# Patient Record
Sex: MALE | Race: WHITE | NOT HISPANIC OR LATINO | Employment: OTHER | URBAN - METROPOLITAN AREA
[De-identification: names, ages, dates, MRNs, and addresses within clinical notes are randomized per-mention and may not be internally consistent; named-entity substitution may affect disease eponyms.]

---

## 2023-07-31 ENCOUNTER — HOSPITAL ENCOUNTER (OUTPATIENT)
Dept: DATA CONVERSION | Facility: HOSPITAL | Age: 73
End: 2023-07-31
Attending: INTERNAL MEDICINE | Admitting: INTERNAL MEDICINE

## 2023-07-31 DIAGNOSIS — Z86.010 PERSONAL HISTORY OF COLONIC POLYPS: ICD-10-CM

## 2023-07-31 DIAGNOSIS — G47.33 OBSTRUCTIVE SLEEP APNEA (ADULT) (PEDIATRIC): ICD-10-CM

## 2023-07-31 DIAGNOSIS — Z88.2 ALLERGY STATUS TO SULFONAMIDES: ICD-10-CM

## 2023-07-31 DIAGNOSIS — J44.9 CHRONIC OBSTRUCTIVE PULMONARY DISEASE, UNSPECIFIED (MULTI): ICD-10-CM

## 2023-07-31 DIAGNOSIS — E78.5 HYPERLIPIDEMIA, UNSPECIFIED: ICD-10-CM

## 2023-07-31 DIAGNOSIS — I10 ESSENTIAL (PRIMARY) HYPERTENSION: ICD-10-CM

## 2023-07-31 DIAGNOSIS — F32.A DEPRESSION, UNSPECIFIED: ICD-10-CM

## 2023-07-31 DIAGNOSIS — D12.4 BENIGN NEOPLASM OF DESCENDING COLON: ICD-10-CM

## 2023-07-31 DIAGNOSIS — Z99.81 DEPENDENCE ON SUPPLEMENTAL OXYGEN: ICD-10-CM

## 2023-07-31 DIAGNOSIS — Z12.11 ENCOUNTER FOR SCREENING FOR MALIGNANT NEOPLASM OF COLON: ICD-10-CM

## 2023-07-31 DIAGNOSIS — Z88.0 ALLERGY STATUS TO PENICILLIN: ICD-10-CM

## 2023-07-31 DIAGNOSIS — K64.8 OTHER HEMORRHOIDS: ICD-10-CM

## 2023-07-31 DIAGNOSIS — K57.30 DIVERTICULOSIS OF LARGE INTESTINE WITHOUT PERFORATION OR ABSCESS WITHOUT BLEEDING: ICD-10-CM

## 2023-07-31 DIAGNOSIS — J96.11 CHRONIC RESPIRATORY FAILURE WITH HYPOXIA (MULTI): ICD-10-CM

## 2023-08-03 LAB
COMPLETE PATHOLOGY REPORT: NORMAL
CONVERTED CLINICAL DIAGNOSIS-HISTORY: NORMAL
CONVERTED FINAL DIAGNOSIS: NORMAL
CONVERTED FINAL REPORT PDF LINK TO COPY AND PASTE: NORMAL
CONVERTED GROSS DESCRIPTION: NORMAL

## 2023-09-29 VITALS
HEART RATE: 72 BPM | TEMPERATURE: 97.2 F | SYSTOLIC BLOOD PRESSURE: 123 MMHG | HEIGHT: 72 IN | RESPIRATION RATE: 18 BRPM | BODY MASS INDEX: 36.58 KG/M2 | DIASTOLIC BLOOD PRESSURE: 100 MMHG | WEIGHT: 270.06 LBS

## 2023-09-30 NOTE — H&P
History of Present Illness:   History Present Illness:  Reason for surgery: surveillance colonoscopy   HPI:      Troy Melendez is a 72 year old male with a history of HTN, COPD, SARAH, HLD, chronic hypoxic respiratory failure on supplemental O2, and depression who presents for surveillance colonoscopy.      He previously had a colonoscopy in 2018. Records from that time are not available,  but [facility] this was an adenomatous polyp and surveillance was recommended in 5 years. He uses 3L O2 on exertion  secondary to COPD. He is not having any GI symptoms. He denies abdominal pain, change in bowel habits, hematochezia, and unintended weight loss.           Past Medical History  COPD  BPH  HTN  HLD  SARAH on CPAP (admits he doesn't use CPAP often; sleeps with wedge under his back)  obesity   neuropathy   lumbar spondylitis   B12 deficiency     Social Hx:  tobacco: none  etoh: none  illicit drug use: none  NSAIDs: none    Family Hx:   No GI malignancy, IBD, or pancreatitis         Current Meds    Medication Name Instruction   Carvedilol TABS    Cholecal DF TABS    Effexor TABS Take 1 tablet twice daily   Finasteride 5 MG Oral Tablet    Mirtazapine 15 MG Oral Tablet    Potassium Chloride Emilie ER 20 MEQ Oral Tablet Extended Release    Protonix 40 MG Oral Tablet Delayed Release Take 1 tablet daily   Simvastatin 10 MG Oral Tablet    traMADol HCl - 50 MG Oral Tablet    Valsartan 80 MG Oral Tablet            Allergies:        Allergies:  ·  penicillin : Unknown  ·  Aleve : Unknown  ·  tamsulosin : Unknown  ·  terazosin : Unknown  ·  sulfa  drugs: Unknown    Home Medication Review:   Home Medications Reviewed: yes     Impression/Procedure:   ·  Impression and Planned Procedure: surveillance colonoscopy       ERAS (Enhanced Recovery After Surgery):  ·  ERAS Patient: no       Vital Signs:  Temperature C: 36.2 degrees C   Temperature F: 97.1 degrees F   Heart Rate: 72 beats per minute    Respiratory Rate: 18 breath per minute   Blood Pressure Systolic: 123 mm/Hg   Blood Pressure Diastolic: 100 mm/Hg     Physical Exam by System:    Constitutional: Resting in bed, NAD, obese   ENMT: HEENT: NC/AT, oropharynx clear   Respiratory/Thorax: CTAB   Cardiovascular: RRR no MRG   Gastrointestinal: Good bowel sounds throughout, soft,  NT/ND   Extremities: No edema   Neurological: A&Ox3, strength and sensation grossly  intact bilaterally   Psychological: appropriate mood and affect   Skin: No jaundice, spider angiomas, hendrickson erythema     Consent:   COVID-19 Consent:  ·  COVID-19 Risk Consent Surgeon has reviewed key risks related to the risk of nellie COVID-19 and if they contract COVID-19 what the risks are.       Electronic Signatures:  Rajesh Zazueta)  (Signed 31-Jul-2023 09:28)   Authored: History of Present Illness, Allergies, Home  Medication Review, Impression/Procedure, ERAS, Physical Exam, Consent, Note Completion      Last Updated: 31-Jul-2023 09:28 by Rajesh Zazueta)

## 2024-04-08 ENCOUNTER — TRANSCRIBE ORDERS (OUTPATIENT)
Dept: CARDIAC REHAB | Facility: HOSPITAL | Age: 74
End: 2024-04-08
Payer: OTHER GOVERNMENT

## 2024-04-08 DIAGNOSIS — J44.9 CHRONIC OBSTRUCTIVE PULMONARY DISEASE, UNSPECIFIED COPD TYPE (MULTI): ICD-10-CM

## 2024-04-15 ENCOUNTER — CLINICAL SUPPORT (OUTPATIENT)
Dept: CARDIAC REHAB | Facility: HOSPITAL | Age: 74
End: 2024-04-15
Payer: OTHER GOVERNMENT

## 2024-04-15 VITALS — HEIGHT: 71 IN | BODY MASS INDEX: 37.99 KG/M2 | WEIGHT: 271.4 LBS

## 2024-04-15 DIAGNOSIS — J44.9 CHRONIC OBSTRUCTIVE PULMONARY DISEASE, UNSPECIFIED COPD TYPE (MULTI): ICD-10-CM

## 2024-04-15 PROBLEM — M54.9 BACK PAIN: Status: ACTIVE | Noted: 2024-04-15

## 2024-04-15 RX ORDER — POTASSIUM CHLORIDE 750 MG/1
CAPSULE, EXTENDED RELEASE ORAL EVERY 12 HOURS
COMMUNITY

## 2024-04-15 RX ORDER — LANOLIN ALCOHOL/MO/W.PET/CERES
CREAM (GRAM) TOPICAL
COMMUNITY

## 2024-04-15 RX ORDER — FINASTERIDE 5 MG/1
TABLET, FILM COATED ORAL
COMMUNITY

## 2024-04-15 RX ORDER — LORATADINE 10 MG/1
TABLET ORAL
COMMUNITY

## 2024-04-15 RX ORDER — MOMETASONE FUROATE 50 UG/1
SPRAY, METERED NASAL EVERY 12 HOURS
COMMUNITY

## 2024-04-15 RX ORDER — FAMOTIDINE 20 MG/1
TABLET, FILM COATED ORAL
COMMUNITY
Start: 2021-08-19

## 2024-04-15 RX ORDER — FLUTICASONE FUROATE AND VILANTEROL TRIFENATATE 50; 25 UG/1; UG/1
POWDER RESPIRATORY (INHALATION)
COMMUNITY
End: 2024-04-23 | Stop reason: WASHOUT

## 2024-04-15 RX ORDER — CARVEDILOL 12.5 MG/1
18 TABLET ORAL 2 TIMES DAILY
COMMUNITY

## 2024-04-15 RX ORDER — MIRTAZAPINE 15 MG/1
TABLET, FILM COATED ORAL
COMMUNITY

## 2024-04-15 RX ORDER — SIMVASTATIN 10 MG/1
TABLET, FILM COATED ORAL
COMMUNITY

## 2024-04-15 RX ORDER — FUROSEMIDE 40 MG/1
TABLET ORAL
COMMUNITY

## 2024-04-15 RX ORDER — TRAMADOL HYDROCHLORIDE 50 MG/1
75 TABLET ORAL
COMMUNITY

## 2024-04-15 ASSESSMENT — DUKE ACTIVITY SCORE INDEX (DASI)
CAN YOU TAKE CARE OF YOURSELF (EAT, DRESS, BATHE, OR USE TOILET): YES
TOTAL_SCORE: 12.7
CAN YOU DO LIGHT WORK AROUND THE HOUSE LIKE DUSTING OR WASHING DISHES: YES
CAN YOU WALK A BLOCK OR TWO ON LEVEL GROUND: NO
DASI METS SCORE: 4.3
CAN YOU DO YARD WORK LIKE RAKING LEAVES, WEEDING OR PUSHING A MOWER: NO
CAN YOU RUN A SHORT DISTANCE: NO
CAN YOU WALK INDOORS, SUCH AS AROUND YOUR HOUSE: YES
CAN YOU PARTICIPATE IN STRENOUS SPORTS LIKE SWIMMING, SINGLES TENNIS, FOOTBALL, BASKETBALL, OR SKIING: NO
CAN YOU HAVE SEXUAL RELATIONS: NO
CAN YOU CLIMB A FLIGHT OF STAIRS OR WALK UP A HILL: YES
CAN YOU DO MODERATE WORK AROUND THE HOUSE LIKE VACUUMING, SWEEPING FLOORS OR CARRYING GROCERIES: NO
CAN YOU PARTICIPATE IN MODERATE RECREATIONAL ACTIVITIES LIKE GOLF, BOWLING, DANCING, DOUBLES TENNIS OR THROWING A BASEBALL OR FOOTBALL: NO
CAN YOU DO HEAVY WORK AROUND THE HOUSE LIKE SCRUBBING FLOORS OR LIFTING AND MOVING HEAVY FURNITURE: NO

## 2024-04-15 ASSESSMENT — ENCOUNTER SYMPTOMS
DEPRESSION: 0
LOSS OF SENSATION IN FEET: 1
OCCASIONAL FEELINGS OF UNSTEADINESS: 1

## 2024-04-15 ASSESSMENT — PATIENT HEALTH QUESTIONNAIRE - PHQ9
1. LITTLE INTEREST OR PLEASURE IN DOING THINGS: MORE THAN HALF THE DAYS
9. THOUGHTS THAT YOU WOULD BE BETTER OFF DEAD, OR OF HURTING YOURSELF: NOT AT ALL
4. FEELING TIRED OR HAVING LITTLE ENERGY: MORE THAN HALF THE DAYS
3. TROUBLE FALLING OR STAYING ASLEEP OR SLEEPING TOO MUCH: NEARLY EVERY DAY
SUM OF ALL RESPONSES TO PHQ9 QUESTIONS 1 & 2: 2
2. FEELING DOWN, DEPRESSED OR HOPELESS: NOT AT ALL
7. TROUBLE CONCENTRATING ON THINGS, SUCH AS READING THE NEWSPAPER OR WATCHING TELEVISION: NOT AT ALL
6. FEELING BAD ABOUT YOURSELF - OR THAT YOU ARE A FAILURE OR HAVE LET YOURSELF OR YOUR FAMILY DOWN: NOT AT ALL
SUM OF ALL RESPONSES TO PHQ QUESTIONS 1-9: 7
SUM OF ALL RESPONSES TO PHQ QUESTIONS 1-9: 7
8. MOVING OR SPEAKING SO SLOWLY THAT OTHER PEOPLE COULD HAVE NOTICED. OR THE OPPOSITE, BEING SO FIGETY OR RESTLESS THAT YOU HAVE BEEN MOVING AROUND A LOT MORE THAN USUAL: NOT AT ALL
5. POOR APPETITE OR OVEREATING: NOT AT ALL

## 2024-04-15 NOTE — PROGRESS NOTES
Pulmonary Rehabilitation   Initial Treatment Plan    Name: Troy Melendez  Medical Record Number: [id number]  YOB: 1950  Age: 73 y.o.    Today’s Date: 4/15/2024  Primary Care Physician: Madi Aceves MD  Referring Physician: Dr. Bautista  Program Location: [facility]  Primary Diagnosis:   1. Chronic obstructive pulmonary disease, unspecified COPD type (Multi)           Onset/Date of Diagnosis: 2009    Initial Assessment, not yet started program.    Falls Risk: High  Psychosocial Assessment  Pre PHQ-9: 7  Post PHQ-9:   Sent PH-Q 9 to MD if score > 20: No; score < 20    Stress Management  Pt reported/currently experiencing stress: No  Patient uses stress management skills: Yes   History of: anxiety and depression  Currently seeing a mental health provider: Yes, Then provider name: Dr. Shawna KING  Social Support: Yes, Whom:Family, Friends  Pre CAT score: 25  Post CAT score:   Learning Assessment:  Learning assessment/barriers: None  Preferred learning method: Auditory, Visual, and Reading handout  Barriers: None  Stages of Change:Action    Psychosocial Plan  Goal Status: Initial Assessment; goals not yet started  Psychosocial Goals: Decrease CAT score  Decrease PHQ-9 score.  Learn about living with chronic lung disease.  Learn to use stress management techniques.    Psychosocial Interventions/Education: Educate patient on how to live with chronic lung disease.  Question patient on new or existing stress/anxiety issues at least once every 30 days.    Oxygen Assessment  SpO2 at rest: 94 % on RA  SpO2 with exertion:     Oxygen Use  Supplemental O2 prescribed: Yes,   Liters at rest: 0 L  Liters with exertion: 2-4 L    Using O2 as prescribed: Yes  [facility]   Demonstrates appropriate knowledge of O2 use: No   Demonstrates appropriate knowledge of O2 safety: No   Home O2 System: Concentrator  Portable O2 System: Portable Oxygen Concentrator  Hypoxia managed: Yes   Home Pulse Oximeter: Yes     Pre  "MMRC: 3  Post MMRC:     Oxygen Plan  Goal Status: Initial Assessment; goals not yet started  Oxygen Goals: Decrease MMRC score  Learn and use diaphragmatic breathing as needed.  Learn and use pursed lip breathing as needed.  Titrate supplemental O2 as needed to keep SpO2 at 88% or greater.    History of SARAH?: Yes, uses cpap regularly    Oxygen Education/Interventions: Learning to use pursed lip breathing during exercise to help lessen dyspnea.  SpO2 checks with pulse oximeter at rest and with each modality.  Supplemental O2 as needed.  Titrate supplemental O2 as needed to keep SpO2 at 88% or greater.    Nutrition Assessment:  Current Dietary Guidelines:  None  Barriers to dietary change: no    Diabetes Assessment  History of Diabetes: No    Weight Management  Height: 180.3 cm (5' 11\")  Weight: 271.4 lbs  BMI: 37.8    Nutrition Plan  Goal Status: Initial Assessment; goals not yet started    Nutrition Goals: Learn about healthy eating habits.  Lose 5-15 lbs. while in program.    Nutrition Interventions/Education: Body weight checked weekly.  Discuss fat screener results and recommendations.    Exercise Assessment  Home Exercise: No  Mode: NA  Frequency: NA  Duration: NA    6 Minute Walk Assessment   6 MWT distance: Not done due to back pain with walking.  FiO2 used during test:     Exercise Prescription  Exercise Prescription based on: Duke Activity Status Index (DASI)  DASI Score: 12.7   MET Score: 4.3   Frequency:  2 days/week   Mode: Recumbent Cycle, Arm Ergometer, and SciFit   Duration: 20-40 total aerobic minutes   Intensity: RPE 11-13  Target HR:  Rest +30  MET Level: 2+  Patient wears supplemental O2: Yes;   O2 Flow Rate: 2 to 6 L/min  SpO2 Range: 87 to 100 %     Modality Workload METs Duration (minutes)   1 Pre-Exercise      2 Scifit Stepper 1.0   5 :00   3 Scifit Stepper 1.0   5 :00   4 Arm Ergometer UEE 0W   5 :00   5 Recumbent Bike #1   5 :00   6 Post-Exercise      Resistance Training: Yes   Home Exercise " Prescription given: To be given prior to discharge from program.    Exercise Plan  Goal Status: Initial Assessment; goals not yet started  Exercise Goals: By discharge:  Increase exercise MET level by 5-10% each week  Increase total exercise duration to 30-45 minutes  Obtain 150 minutes/week of moderate intensity aerobic exercise  Initiate strength training 2-3 days a week  Initiate flexibility training 2-3 days a week  Establish a home exercise program before discharge    Exercise Interventions/Education: SPO2 goal is to add or titrate supplemnetal O2 as necessary at rest/exercise via nasal cannula or mask to prevent <88% SPO2.  Target goal for duration: 30-45 minutes; Increase MET level 5-10% each week or as tolerated.    Physical exercise restrictions: Back pain with walking, will not do TM.    Other Core Components/Risk Factor Assessment:  Medication adherence  Current Medications:              Medication compliance: Yes   Uses pill box/organizer: Yes    Carries medication list: No    Uses Inhalers appropriately: Yes     Blood Pressure Management  History of Hypertension: Yes   Medication Changes: No   Resting BP:  There were no vitals taken for this visit.     Heart Failure Management  Hx of Heart Failure: No    Smoking/Tobacco Assessment  Social History     Tobacco Use   Smoking Status Former    Current packs/day: 0.00    Average packs/day: 1 pack/day for 42.0 years (42.0 ttl pk-yrs)    Types: Cigarettes    Start date: 1967    Quit date: 2009    Years since quitting: 15.2    Passive exposure: Past   Smokeless Tobacco Never     Other Core Component Plan  Goal Status: Initial Assessment; goals not yet started    Other Core Component Goals: Learn to manage bronchial hygiene  Resting BP < 160/90.  Use all medications properly and as prescribed.  Remain cigarette free.    Frequent Cough?: No  Educational Assessment:  Pulmonary Knowledge Test:   Intake: 12  Discharge:     Other Core Component  Interventions/Education: Ensure patient is compliant with all medications throughout program.  Explain bronchial hygiene techniques.  Pre/post resting BPs at each session.    Individual Patient Goals:  Be able to do more  Do more housework  Increase strength and endurance    Goal Status: Initial Assessment; goals not yet started    Staff Comments:    Rehab Staff Signature: Tony Mora, RRT

## 2024-04-23 ENCOUNTER — CLINICAL SUPPORT (OUTPATIENT)
Dept: CARDIAC REHAB | Facility: HOSPITAL | Age: 74
End: 2024-04-23
Payer: OTHER GOVERNMENT

## 2024-04-23 DIAGNOSIS — J44.9 CHRONIC OBSTRUCTIVE PULMONARY DISEASE, UNSPECIFIED COPD TYPE (MULTI): ICD-10-CM

## 2024-04-23 PROCEDURE — 94625 PHY/QHP OP PULM RHB W/O MNTR: CPT | Performed by: INTERNAL MEDICINE

## 2024-04-23 RX ORDER — GUAIFENESIN 400 MG/1
400 TABLET ORAL EVERY 4 HOURS PRN
COMMUNITY

## 2024-04-23 RX ORDER — TROSPIUM CHLORIDE 20 MG/1
20 TABLET, FILM COATED ORAL DAILY
COMMUNITY

## 2024-04-23 RX ORDER — BUDESONIDE, GLYCOPYRROLATE, AND FORMOTEROL FUMARATE 160; 9; 4.8 UG/1; UG/1; UG/1
2 AEROSOL, METERED RESPIRATORY (INHALATION) 2 TIMES DAILY
COMMUNITY

## 2024-04-23 RX ORDER — MAGNESIUM OXIDE 420 MG/1
420 TABLET ORAL DAILY
COMMUNITY

## 2024-04-23 RX ORDER — PANTOPRAZOLE SODIUM 20 MG/1
20 TABLET, DELAYED RELEASE ORAL 2 TIMES DAILY
COMMUNITY

## 2024-04-25 ENCOUNTER — CLINICAL SUPPORT (OUTPATIENT)
Dept: CARDIAC REHAB | Facility: HOSPITAL | Age: 74
End: 2024-04-25
Payer: OTHER GOVERNMENT

## 2024-04-25 DIAGNOSIS — J44.9 CHRONIC OBSTRUCTIVE PULMONARY DISEASE, UNSPECIFIED COPD TYPE (MULTI): ICD-10-CM

## 2024-04-25 PROCEDURE — 94625 PHY/QHP OP PULM RHB W/O MNTR: CPT | Performed by: INTERNAL MEDICINE

## 2024-04-30 ENCOUNTER — CLINICAL SUPPORT (OUTPATIENT)
Dept: CARDIAC REHAB | Facility: HOSPITAL | Age: 74
End: 2024-04-30
Payer: OTHER GOVERNMENT

## 2024-04-30 DIAGNOSIS — J44.9 CHRONIC OBSTRUCTIVE PULMONARY DISEASE, UNSPECIFIED COPD TYPE (MULTI): ICD-10-CM

## 2024-04-30 PROCEDURE — 94625 PHY/QHP OP PULM RHB W/O MNTR: CPT | Performed by: INTERNAL MEDICINE

## 2024-05-02 ENCOUNTER — CLINICAL SUPPORT (OUTPATIENT)
Dept: CARDIAC REHAB | Facility: HOSPITAL | Age: 74
End: 2024-05-02
Payer: OTHER GOVERNMENT

## 2024-05-02 DIAGNOSIS — J44.9 CHRONIC OBSTRUCTIVE PULMONARY DISEASE, UNSPECIFIED COPD TYPE (MULTI): ICD-10-CM

## 2024-05-02 PROCEDURE — 94625 PHY/QHP OP PULM RHB W/O MNTR: CPT | Performed by: INTERNAL MEDICINE

## 2024-05-07 ENCOUNTER — CLINICAL SUPPORT (OUTPATIENT)
Dept: CARDIAC REHAB | Facility: HOSPITAL | Age: 74
End: 2024-05-07
Payer: OTHER GOVERNMENT

## 2024-05-07 DIAGNOSIS — J44.9 CHRONIC OBSTRUCTIVE PULMONARY DISEASE, UNSPECIFIED COPD TYPE (MULTI): ICD-10-CM

## 2024-05-07 PROCEDURE — 94625 PHY/QHP OP PULM RHB W/O MNTR: CPT | Performed by: INTERNAL MEDICINE

## 2024-05-09 ENCOUNTER — CLINICAL SUPPORT (OUTPATIENT)
Dept: CARDIAC REHAB | Facility: HOSPITAL | Age: 74
End: 2024-05-09
Payer: OTHER GOVERNMENT

## 2024-05-09 DIAGNOSIS — J44.9 CHRONIC OBSTRUCTIVE PULMONARY DISEASE, UNSPECIFIED COPD TYPE (MULTI): ICD-10-CM

## 2024-05-09 PROCEDURE — 94625 PHY/QHP OP PULM RHB W/O MNTR: CPT | Performed by: INTERNAL MEDICINE

## 2024-05-14 ENCOUNTER — CLINICAL SUPPORT (OUTPATIENT)
Dept: CARDIAC REHAB | Facility: HOSPITAL | Age: 74
End: 2024-05-14
Payer: OTHER GOVERNMENT

## 2024-05-14 DIAGNOSIS — J44.9 CHRONIC OBSTRUCTIVE PULMONARY DISEASE, UNSPECIFIED COPD TYPE (MULTI): ICD-10-CM

## 2024-05-14 PROCEDURE — 94625 PHY/QHP OP PULM RHB W/O MNTR: CPT | Performed by: INTERNAL MEDICINE

## 2024-05-15 ENCOUNTER — DOCUMENTATION (OUTPATIENT)
Dept: CARDIAC REHAB | Facility: HOSPITAL | Age: 74
End: 2024-05-15
Payer: OTHER GOVERNMENT

## 2024-05-15 NOTE — PROGRESS NOTES
Pulmonary Rehabilitation   30 Day Reassessment    Name: Troy Melendez  Medical Record Number: [id number]  YOB: 1950  Age: 73 y.o.    Today’s Date: 5/15/2024  Primary Care Physician: Madi Aceves MD  Referring Physician: Dr. Bautista  Program Location: [facility]  Primary Diagnosis:   1. Chronic obstructive pulmonary disease, unspecified COPD type (Multi)        Onset/Date of Diagnosis: 2009    Session #: 7    Falls Risk: High  Psychosocial Assessment  Pre PHQ-9: 7  Post PHQ-9:   Sent PH-Q 9 to MD if score > 20: No; score < 20    Stress Management  Pt reported/currently experiencing stress: No  Patient uses stress management skills: Yes   History of: anxiety and depression  Currently seeing a mental health provider: Yes, Then provider name: Dr. Shawna KING  Social Support: Yes, Whom:Family, Friends  Pre CAT score: 25  Post CAT score:   Learning Assessment:  Learning assessment/barriers: None  Preferred learning method: Auditory, Visual, and Reading handout  Barriers: None  Stages of Change:Action    Psychosocial Plan  Goal Status: In Progress  Psychosocial Goals: Decrease CAT score  Decrease PHQ-9 score.  Learn about living with chronic lung disease.  Learn to use stress management techniques.    Psychosocial Interventions/Education: Educate patient on how to live with chronic lung disease.  Question patient on new or existing stress/anxiety issues at least once every 30 days.  30 Day Reassessment: Patient reports no new stress, anxiety or depression at this time.    Oxygen Assessment  SpO2 at rest: 98 % on 2L  SpO2 with exertion: 87% on 2L    Oxygen Use  Supplemental O2 prescribed: Yes,   Liters at rest: 0 L  Liters with exertion: 2-4 L    Using O2 as prescribed: Yes  [facility]   Demonstrates appropriate knowledge of O2 use: No   Demonstrates appropriate knowledge of O2 safety: No   Home O2 System: Concentrator  Portable O2 System: Portable Oxygen Concentrator  Hypoxia managed: Yes   Home  "Pulse Oximeter: Yes     Pre MMRC: 3  Post MMRC:     Oxygen Plan  Goal Status: In Progress  Oxygen Goals: Decrease MMRC score  Learn and use diaphragmatic breathing as needed.  Learn and use pursed lip breathing as needed.  Titrate supplemental O2 as needed to keep SpO2 at 88% or greater.    History of SARAH?: Yes, uses cpap regularly    Oxygen Education/Interventions: Learning to use pursed lip breathing during exercise to help lessen dyspnea.  SpO2 checks with pulse oximeter at rest and with each modality.  Supplemental O2 as needed.  Titrate supplemental O2 as needed to keep SpO2 at 88% or greater.  30 Day Reassessment: Pt educated on diaphragmatic and pursed lip breathing techniques: Yes  Continuing to reinforce proper breathing techniques.  Spo2 of 87 or > maintained on 2L with exercise.    Nutrition Assessment:  Current Dietary Guidelines:  None  Barriers to dietary change: no    Diabetes Assessment  History of Diabetes: No    Weight Management  Start of Rehab Weight: 271.4  Height: 180.3 cm (5' 11\")  Weight: 270.2 lbs  BMI: 37.7    Nutrition Plan  Goal Status: In Progress  Nutrition Goals: Learn about healthy eating habits.  Lose 5-15 lbs. while in program.    Nutrition Interventions/Education: Body weight checked weekly.  Discuss fat screener results and recommendations.  30 Day Reassessment: Continuing to re-inforce healthy eating habits for pt.  Fatscreener & AHA H/O on fats reviewed and given to patient: Yes  Weight loss of 1.2 lbs since start of rehab. Troy states his diet is improving.    Exercise Assessment  Home Exercise: No  Mode: NA  Frequency: NA  Duration: NA    6 Minute Walk Assessment   6 MWT distance: Not done due to back pain with walking.    Exercise Prescription  Exercise Prescription based on: Duke Activity Status Index (DASI)  DASI Score: 12.7  MET Score: 4.3   Frequency:  2 days/week   Mode: Recumbent Cycle, Arm Ergometer, and SciFit   Duration: 20-40 total aerobic minutes   Intensity: RPE " 11-13  Target HR:  Rest +30  MET Level: 2+  Patient wears supplemental O2: Yes;   O2 Flow Rate: 2 to 6 L/min  SpO2 Range: 87 to 100 %     Modality Workload METs Duration (minutes)   1 Pre-Exercise      2 Scifit Stepper 1.0  2.5 8 :00   3 Scifit Stepper 1.0  2.5 8 :00   4 Arm Ergometer UBE 1.0 2.1 8 :00   5 Arm Ergometer UBE 1.0  2.1 8 :00   6 Post-Exercise      Resistance Training: Yes   Home Exercise Prescription given: To be given prior to discharge from program.    Exercise Plan  Goal Status:   Exercise Goals: By discharge:  Increase exercise MET level by 5-10% each week  Increase total exercise duration to 30-45 minutes  Obtain 150 minutes/week of moderate intensity aerobic exercise  Initiate strength training 2-3 days a week  Initiate flexibility training 2-3 days a week  Establish a home exercise program before discharge    Exercise Interventions/Education: SPO2 goal is to add or titrate supplemnetal O2 as necessary at rest/exercise via nasal cannula or mask to prevent <88% SPO2.  Target goal for duration: 30-45 minutes; Increase MET level 5-10% each week or as tolerated.    Physical exercise restrictions: Back pain with walking, will not do TM. Unable to do RB.  30 Day Reassessment: Changes made based on HR and RPE/D responses to exercise.  Received education each week present.  Endurance increased to 32 minutes per session.  Education Classes Completed: Using an Inhaler, Breathing Techniques, How to Live with COPD, Anxiety/Stress and Depression.    Other Core Components/Risk Factor Assessment:  Medication adherence  Current Outpatient Medications on File Prior to Visit   Medication Sig Dispense Refill    budesonide-glycopyr-formoterol (Breztri Aerosphere) 160-9-4.8 mcg/actuation HFA aerosol inhaler Inhale 2 puffs 2 times a day.      carvedilol (Coreg) 12.5 mg tablet Take 18 mg by mouth twice a day.      cyanocobalamin (Vitamin B-12) 1,000 mcg tablet Orally      famotidine (Pepcid) 20 mg tablet Take by mouth  once daily.      finasteride (Proscar) 5 mg tablet Take by mouth.      furosemide (Lasix) 40 mg tablet Take by mouth.      guaiFENesin (Humibid 3) 400 mg tablet Take 1 tablet (400 mg) by mouth every 4 hours if needed for cough.      loratadine (Claritin) 10 mg tablet Take by mouth.      magnesium oxide (Mag-Ox) 420 mg tablet Take 1 tablet (420 mg) by mouth once daily.      mirtazapine (Remeron) 15 mg tablet Take by mouth.      mometasone (Nasonex) 50 mcg/actuation nasal spray every 12 hours.      pantoprazole (ProtoNix) 20 mg EC tablet Take 1 tablet (20 mg) by mouth twice a day.      potassium chloride ER (Micro-K) 10 mEq ER capsule every 12 hours.      simvastatin (Zocor) 10 mg tablet Take by mouth.      traMADol (Ultram) 50 mg tablet Take 1.5 tablets (75 mg) by mouth.      trospium (Sanctura) 20 mg tablet Take 1 tablet (20 mg) by mouth once daily.      valsartan 80 mg tablet 80 mg, hydroCHLOROthiazide 12.5 mg tablet 12.5 mg Take 40 mg by mouth once daily.      venlafaxine (Effoxor) 12.5 MG split tablet Take 2 half tablet (25 mg) by mouth 2 times a day.       No current facility-administered medications on file prior to visit.   Current Medications:              Medication compliance: Yes   Uses pill box/organizer: Yes    Carries medication list: No    Uses Inhalers appropriately: Yes     Blood Pressure Management  History of Hypertension: Yes   Medication Changes: No   Resting BP:  114/69    Heart Failure Management  Hx of Heart Failure: No    Smoking/Tobacco Assessment  Social History     Tobacco Use   Smoking Status Former    Current packs/day: 0.00    Average packs/day: 1 pack/day for 42.0 years (42.0 ttl pk-yrs)    Types: Cigarettes    Start date: 1967    Quit date: 2009    Years since quitting: 15.3    Passive exposure: Past   Smokeless Tobacco Never     Other Core Component Plan  Goal Status: In Progress  Other Core Component Goals: Learn to manage bronchial hygiene  Resting BP < 160/90.  Use all medications  properly and as prescribed.  Remain cigarette free.    Frequent Cough?: No  Educational Assessment:  Pulmonary Knowledge Test:   Intake: 12  Discharge:     Other Core Component Interventions/Education: Ensure patient is compliant with all medications throughout program.  Explain bronchial hygiene techniques.  Pre/post resting BPs at each session.  30 Day Reassessment: Reviewed effects of exercise on BP: Yes  Reviewed knowledge test: Yes  Remains cigarette free at this time.  BP remains within target goal, compliant with meds. Pt reports no issues at this time.    Individual Patient Goals:  Be able to do more  Do more housework  Increase strength and endurance    Goal Status: In Progress    Staff Comments:  30 Day Reassessment: Troy has done well in pulmonay rehab. He has attended 7 sessions and seems to be motivated to make progress. Currently exercising for 32 minutes and METs are in between 2.1 and 2.5. We will continue to increase duration and resistance as able.    Rehab Staff Signature: Tony Mora, RRT

## 2024-05-16 ENCOUNTER — CLINICAL SUPPORT (OUTPATIENT)
Dept: CARDIAC REHAB | Facility: HOSPITAL | Age: 74
End: 2024-05-16
Payer: OTHER GOVERNMENT

## 2024-05-16 DIAGNOSIS — J44.9 CHRONIC OBSTRUCTIVE PULMONARY DISEASE, UNSPECIFIED COPD TYPE (MULTI): ICD-10-CM

## 2024-05-16 PROCEDURE — 94625 PHY/QHP OP PULM RHB W/O MNTR: CPT | Performed by: INTERNAL MEDICINE

## 2024-05-21 ENCOUNTER — APPOINTMENT (OUTPATIENT)
Dept: CARDIAC REHAB | Facility: HOSPITAL | Age: 74
End: 2024-05-21
Payer: OTHER GOVERNMENT

## 2024-05-23 ENCOUNTER — APPOINTMENT (OUTPATIENT)
Dept: CARDIAC REHAB | Facility: HOSPITAL | Age: 74
End: 2024-05-23
Payer: OTHER GOVERNMENT

## 2024-05-28 ENCOUNTER — CLINICAL SUPPORT (OUTPATIENT)
Dept: CARDIAC REHAB | Facility: HOSPITAL | Age: 74
End: 2024-05-28
Payer: OTHER GOVERNMENT

## 2024-05-28 DIAGNOSIS — J44.9 CHRONIC OBSTRUCTIVE PULMONARY DISEASE, UNSPECIFIED COPD TYPE (MULTI): ICD-10-CM

## 2024-05-28 PROCEDURE — 94625 PHY/QHP OP PULM RHB W/O MNTR: CPT | Performed by: INTERNAL MEDICINE

## 2024-05-30 ENCOUNTER — CLINICAL SUPPORT (OUTPATIENT)
Dept: CARDIAC REHAB | Facility: HOSPITAL | Age: 74
End: 2024-05-30
Payer: OTHER GOVERNMENT

## 2024-05-30 DIAGNOSIS — J44.9 CHRONIC OBSTRUCTIVE PULMONARY DISEASE, UNSPECIFIED COPD TYPE (MULTI): ICD-10-CM

## 2024-05-30 PROCEDURE — 94625 PHY/QHP OP PULM RHB W/O MNTR: CPT | Performed by: INTERNAL MEDICINE

## 2024-06-04 ENCOUNTER — CLINICAL SUPPORT (OUTPATIENT)
Dept: CARDIAC REHAB | Facility: HOSPITAL | Age: 74
End: 2024-06-04
Payer: OTHER GOVERNMENT

## 2024-06-04 DIAGNOSIS — J44.9 CHRONIC OBSTRUCTIVE PULMONARY DISEASE, UNSPECIFIED COPD TYPE (MULTI): ICD-10-CM

## 2024-06-04 PROCEDURE — 94625 PHY/QHP OP PULM RHB W/O MNTR: CPT | Performed by: INTERNAL MEDICINE

## 2024-06-06 ENCOUNTER — CLINICAL SUPPORT (OUTPATIENT)
Dept: CARDIAC REHAB | Facility: HOSPITAL | Age: 74
End: 2024-06-06
Payer: OTHER GOVERNMENT

## 2024-06-06 DIAGNOSIS — J44.9 CHRONIC OBSTRUCTIVE PULMONARY DISEASE, UNSPECIFIED COPD TYPE (MULTI): ICD-10-CM

## 2024-06-06 PROCEDURE — 94625 PHY/QHP OP PULM RHB W/O MNTR: CPT | Performed by: INTERNAL MEDICINE

## 2024-06-11 ENCOUNTER — CLINICAL SUPPORT (OUTPATIENT)
Dept: CARDIAC REHAB | Facility: HOSPITAL | Age: 74
End: 2024-06-11
Payer: OTHER GOVERNMENT

## 2024-06-11 DIAGNOSIS — J44.9 CHRONIC OBSTRUCTIVE PULMONARY DISEASE, UNSPECIFIED COPD TYPE (MULTI): ICD-10-CM

## 2024-06-11 PROCEDURE — 94625 PHY/QHP OP PULM RHB W/O MNTR: CPT | Performed by: INTERNAL MEDICINE

## 2024-06-12 ENCOUNTER — DOCUMENTATION (OUTPATIENT)
Dept: CARDIAC REHAB | Facility: HOSPITAL | Age: 74
End: 2024-06-12
Payer: OTHER GOVERNMENT

## 2024-06-12 NOTE — PROGRESS NOTES
Pulmonary Rehabilitation   60 Day Reassessment    Name: Troy Melendez  Medical Record Number: [id number]  YOB: 1950  Age: 73 y.o.    Today’s Date: 6/12/2024  Primary Care Physician: Madi Aceves MD  Referring Physician: Dr. Bautista  Program Location: [facility]  Primary Diagnosis:   1. Chronic obstructive pulmonary disease, unspecified COPD type (Multi)        Onset/Date of Diagnosis: 2009    Session #: 13    Falls Risk: High  Psychosocial Assessment  Pre PHQ-9: 7  Post PHQ-9:   Sent PH-Q 9 to MD if score > 20: No; score < 20    Stress Management  Pt reported/currently experiencing stress: No  Patient uses stress management skills: Yes   History of: anxiety and depression  Currently seeing a mental health provider: Yes, Then provider name: Dr. Shawna KING  Social Support: Yes, Whom:Family, Friends  Pre CAT score: 25  Post CAT score:   Learning Assessment:  Learning assessment/barriers: None  Preferred learning method: Auditory, Visual, and Reading handout  Barriers: None  Stages of Change:Action    Psychosocial Plan  Goal Status: In Progress  Psychosocial Goals: Decrease CAT score  Decrease PHQ-9 score.  Learn about living with chronic lung disease.  Learn to use stress management techniques.    Psychosocial Interventions/Education: Educate patient on how to live with chronic lung disease.  Question patient on new or existing stress/anxiety issues at least once every 30 days.  60 Day Reassessment: Patient reports no new stress, anxiety or depression at this time.    Oxygen Assessment  SpO2 at rest: 97 % on 2L  SpO2 with exertion: 87% on 2L    Oxygen Use  Supplemental O2 prescribed: Yes,   Liters at rest: 0 L  Liters with exertion: 2-4 L    Using O2 as prescribed: Yes  [facility]   Demonstrates appropriate knowledge of O2 use: No   Demonstrates appropriate knowledge of O2 safety: No   Home O2 System: Concentrator  Portable O2 System: Portable Oxygen Concentrator  Hypoxia managed: Yes  "  Home Pulse Oximeter: Yes     Pre MMRC: 3  Post MMRC:     Oxygen Plan  Goal Status: In Progress  Oxygen Goals: Decrease MMRC score  Learn and use diaphragmatic breathing as needed.  Learn and use pursed lip breathing as needed.  Titrate supplemental O2 as needed to keep SpO2 at 88% or greater.    History of SARAH?: Yes, uses cpap regularly    Oxygen Education/Interventions: Learning to use pursed lip breathing during exercise to help lessen dyspnea.  SpO2 checks with pulse oximeter at rest and with each modality.  Supplemental O2 as needed.  Titrate supplemental O2 as needed to keep SpO2 at 88% or greater.  60 Day Reassessment: Pt educated on diaphragmatic and pursed lip breathing techniques: Yes  Continuing to reinforce proper breathing techniques.  Spo2 of 87 or > maintained on 2L with exercise.    Nutrition Assessment:  Current Dietary Guidelines:  None  Barriers to dietary change: no    Diabetes Assessment  History of Diabetes: No    Weight Management  Start of Rehab Weight: 271.4  Height: 180.3 cm (5' 11\")  Weight: 263.8 lbs  BMI: 36.8    Nutrition Plan  Goal Status: In Progress  Nutrition Goals: Learn about healthy eating habits.  Lose 5-15 lbs. while in program.    Nutrition Interventions/Education: Body weight checked weekly.  Discuss fat screener results and recommendations.  60 Day Reassessment: Continuing to re-inforce healthy eating habits for pt.  Fatscreener & AHA H/O on fats reviewed and given to patient: Yes  Weight loss of 7.6 lbs since start of rehab. Troy states his diet has been going well.    Exercise Assessment  Home Exercise: Occasional  Mode: Weights  Frequency: 2/wk  Duration: 15 mins    6 Minute Walk Assessment   6 MWT distance: Not done due to back pain with walking.    Exercise Prescription  Exercise Prescription based on: Duke Activity Status Index (DASI)  DASI Score: 12.7  MET Score: 4.3   Frequency:  2 days/week   Mode: Recumbent Cycle, Arm Ergometer, and SciFit   Duration: 20-40 " total aerobic minutes   Intensity: RPE 11-13  Target HR:  Rest +30  MET Level: 2+  Patient wears supplemental O2: Yes;   O2 Flow Rate: 2 to 6 L/min  SpO2 Range: 87 to 100 %     Modality Workload METs Duration (minutes)   1 Pre-Exercise      2 Scifit Stepper 1.2 2.7 10 :00   3 Hermilo Scifit 1.0  2.1 10 :00   4 Arm Ergometer UBE 1.2 2.1 10 :00   5 Arm Ergometer UBE 1.2 2.1 10 :00   6 Post-Exercise      Resistance Training: Yes   Home Exercise Prescription given: Yes  Previous instruction on free weights, pt states he does not need re-instruction.  Patient demonstrated proper technique and had no questions or concerns.  Home exercise program with stretching, balance exercises and weight exercises given.    Exercise Plan  Goal Status: In Progress  Exercise Goals: By discharge:  Increase exercise MET level by 5-10% each week  Increase total exercise duration to 30-45 minutes  Obtain 150 minutes/week of moderate intensity aerobic exercise  Initiate strength training 2-3 days a week  Initiate flexibility training 2-3 days a week  Establish a home exercise program before discharge    Exercise Interventions/Education: SPO2 goal is to add or titrate supplemnetal O2 as necessary at rest/exercise via nasal cannula or mask to prevent <88% SPO2.  Target goal for duration: 30-45 minutes; Increase MET level 5-10% each week or as tolerated.    Physical exercise restrictions: Back pain with walking, will not do TM. Unable to do RB.  60 Day Reassessment: Changes made based on HR and RPE/D responses to exercise.  Received education each week present.  Endurance increased to 40 minutes per session.  Education Classes Completed: Using an Inhaler, Breathing Techniques, How to Live with COPD, Anxiety/Stress and Depression, Nutrition, Smoking Cessation, Exercise.    Other Core Components/Risk Factor Assessment:  Medication adherence  Current Outpatient Medications on File Prior to Visit   Medication Sig Dispense Refill     budesonide-glycopyr-formoterol (Breztri Aerosphere) 160-9-4.8 mcg/actuation HFA aerosol inhaler Inhale 2 puffs 2 times a day.      carvedilol (Coreg) 12.5 mg tablet Take 18 mg by mouth twice a day.      cyanocobalamin (Vitamin B-12) 1,000 mcg tablet Orally      famotidine (Pepcid) 20 mg tablet Take by mouth once daily.      finasteride (Proscar) 5 mg tablet Take by mouth.      furosemide (Lasix) 40 mg tablet Take by mouth.      guaiFENesin (Humibid 3) 400 mg tablet Take 1 tablet (400 mg) by mouth every 4 hours if needed for cough.      loratadine (Claritin) 10 mg tablet Take by mouth.      magnesium oxide (Mag-Ox) 420 mg tablet Take 1 tablet (420 mg) by mouth once daily.      mirtazapine (Remeron) 15 mg tablet Take by mouth.      mometasone (Nasonex) 50 mcg/actuation nasal spray every 12 hours.      pantoprazole (ProtoNix) 20 mg EC tablet Take 1 tablet (20 mg) by mouth twice a day.      potassium chloride ER (Micro-K) 10 mEq ER capsule every 12 hours.      simvastatin (Zocor) 10 mg tablet Take by mouth.      traMADol (Ultram) 50 mg tablet Take 1.5 tablets (75 mg) by mouth.      trospium (Sanctura) 20 mg tablet Take 1 tablet (20 mg) by mouth once daily.      valsartan 80 mg tablet 80 mg, hydroCHLOROthiazide 12.5 mg tablet 12.5 mg Take 40 mg by mouth once daily.      venlafaxine (Effoxor) 12.5 MG split tablet Take 2 half tablet (25 mg) by mouth 2 times a day.       No current facility-administered medications on file prior to visit.               Medication compliance: Yes   Uses pill box/organizer: Yes    Carries medication list: No    Uses Inhalers appropriately: Yes     Blood Pressure Management  History of Hypertension: Yes   Medication Changes: No   Resting BP:  108/69    Heart Failure Management  Hx of Heart Failure: No    Smoking/Tobacco Assessment  Social History     Tobacco Use   Smoking Status Former    Current packs/day: 0.00    Average packs/day: 1 pack/day for 42.0 years (42.0 ttl pk-yrs)    Types:  Cigarettes    Start date: 1967    Quit date: 2009    Years since quitting: 15.4    Passive exposure: Past   Smokeless Tobacco Never     Other Core Component Plan  Goal Status: In Progress  Other Core Component Goals: Learn to manage bronchial hygiene  Resting BP < 160/90.  Use all medications properly and as prescribed.  Remain cigarette free.    Frequent Cough?: No  Educational Assessment:  Pulmonary Knowledge Test:   Intake: 12  Discharge:     Other Core Component Interventions/Education: Ensure patient is compliant with all medications throughout program.  Explain bronchial hygiene techniques.  Pre/post resting BPs at each session.  60 Day Reassessment: Reviewed effects of exercise on BP: Yes  Reviewed knowledge test: Yes  Remains cigarette free at this time.  BP remains within target goal, compliant with meds. Pt reports no issues at this time.    Individual Patient Goals:  Be able to do more  Do more housework  Increase strength and endurance    Goal Status: In Progress    Staff Comments:  60 Day Reassessment: Troy has completed 13 sessions of pulmonary rehab. Endurance increased to 40 minutes per session and METs have increased to a max of 2.7. We will continue to increase resistance as able. Troy has improved his diet and has lost 7.6 lbs since start of rehab.    Rehab Staff Signature: Toyn Mora, RRT

## 2024-06-13 ENCOUNTER — CLINICAL SUPPORT (OUTPATIENT)
Dept: CARDIAC REHAB | Facility: HOSPITAL | Age: 74
End: 2024-06-13
Payer: OTHER GOVERNMENT

## 2024-06-13 DIAGNOSIS — J44.9 CHRONIC OBSTRUCTIVE PULMONARY DISEASE, UNSPECIFIED COPD TYPE (MULTI): ICD-10-CM

## 2024-06-13 PROCEDURE — 94625 PHY/QHP OP PULM RHB W/O MNTR: CPT | Performed by: INTERNAL MEDICINE

## 2024-06-18 ENCOUNTER — APPOINTMENT (OUTPATIENT)
Dept: CARDIAC REHAB | Facility: HOSPITAL | Age: 74
End: 2024-06-18
Payer: OTHER GOVERNMENT

## 2024-06-20 ENCOUNTER — APPOINTMENT (OUTPATIENT)
Dept: CARDIAC REHAB | Facility: HOSPITAL | Age: 74
End: 2024-06-20
Payer: OTHER GOVERNMENT

## 2024-06-25 ENCOUNTER — CLINICAL SUPPORT (OUTPATIENT)
Dept: CARDIAC REHAB | Facility: HOSPITAL | Age: 74
End: 2024-06-25
Payer: OTHER GOVERNMENT

## 2024-06-25 DIAGNOSIS — J44.9 CHRONIC OBSTRUCTIVE PULMONARY DISEASE, UNSPECIFIED COPD TYPE (MULTI): ICD-10-CM

## 2024-06-25 PROCEDURE — 94625 PHY/QHP OP PULM RHB W/O MNTR: CPT | Performed by: INTERNAL MEDICINE

## 2024-06-27 ENCOUNTER — APPOINTMENT (OUTPATIENT)
Dept: CARDIAC REHAB | Facility: HOSPITAL | Age: 74
End: 2024-06-27
Payer: OTHER GOVERNMENT

## 2024-07-02 ENCOUNTER — CLINICAL SUPPORT (OUTPATIENT)
Dept: CARDIAC REHAB | Facility: HOSPITAL | Age: 74
End: 2024-07-02
Payer: OTHER GOVERNMENT

## 2024-07-02 DIAGNOSIS — J44.9 CHRONIC OBSTRUCTIVE PULMONARY DISEASE, UNSPECIFIED COPD TYPE (MULTI): ICD-10-CM

## 2024-07-02 PROCEDURE — 94625 PHY/QHP OP PULM RHB W/O MNTR: CPT | Performed by: INTERNAL MEDICINE

## 2024-07-03 ENCOUNTER — DOCUMENTATION (OUTPATIENT)
Dept: CARDIAC REHAB | Facility: HOSPITAL | Age: 74
End: 2024-07-03
Payer: OTHER GOVERNMENT

## 2024-07-03 NOTE — PROGRESS NOTES
Pulmonary Rehabilitation   90 Day Reassessment    Name: Troy Melendez  Medical Record Number: [id number]  YOB: 1950  Age: 73 y.o.    Today’s Date: 7/3/2024  Primary Care Physician: Madi Aceves MD  Referring Physician: Dr. Bautista  Program Location: [facility]  Primary Diagnosis:   1. Chronic obstructive pulmonary disease, unspecified COPD type (Multi)        Onset/Date of Diagnosis: 2009    Session #: 16    Falls Risk: High  Psychosocial Assessment  Pre PHQ-9: 7  Post PHQ-9:   Sent PH-Q 9 to MD if score > 20: No; score < 20    Stress Management  Pt reported/currently experiencing stress: No  Patient uses stress management skills: Yes   History of: anxiety and depression  Currently seeing a mental health provider: Yes, Then provider name: Dr. Shawna KING  Social Support: Yes, Whom:Family, Friends  Pre CAT score: 25  Post CAT score:   Learning Assessment:  Learning assessment/barriers: None  Preferred learning method: Auditory, Visual, and Reading handout  Barriers: None  Stages of Change:Action    Psychosocial Plan  Goal Status: In Progress  Psychosocial Goals: Decrease CAT score  Decrease PHQ-9 score.  Learn about living with chronic lung disease.  Learn to use stress management techniques.    Psychosocial Interventions/Education: Educate patient on how to live with chronic lung disease.  Question patient on new or existing stress/anxiety issues at least once every 30 days.  90 Day Reassessment: Patient reports no new stress, anxiety or depression at this time.    Oxygen Assessment  SpO2 at rest: 97 % on 2L  SpO2 with exertion: 87% on 2L    Oxygen Use  Supplemental O2 prescribed: Yes,   Liters at rest: 0 L  Liters with exertion: 2-4 L    Using O2 as prescribed: Yes  [facility]   Demonstrates appropriate knowledge of O2 use: No   Demonstrates appropriate knowledge of O2 safety: No   Home O2 System: Concentrator  Portable O2 System: Portable Oxygen Concentrator  Hypoxia managed: Yes   Home  "Pulse Oximeter: Yes     Pre MMRC: 3  Post MMRC:     Oxygen Plan  Goal Status: In Progress  Oxygen Goals: Decrease MMRC score  Learn and use diaphragmatic breathing as needed.  Learn and use pursed lip breathing as needed.  Titrate supplemental O2 as needed to keep SpO2 at 88% or greater.    History of SARAH?: Yes, uses cpap regularly    Oxygen Education/Interventions: Learning to use pursed lip breathing during exercise to help lessen dyspnea.  SpO2 checks with pulse oximeter at rest and with each modality.  Supplemental O2 as needed.  Titrate supplemental O2 as needed to keep SpO2 at 88% or greater.  90 Day Reassessment: Pt educated on diaphragmatic and pursed lip breathing techniques: Yes  Continuing to reinforce proper breathing techniques.  Spo2 of 87 or > maintained on 2L with exercise.    Nutrition Assessment:  Current Dietary Guidelines:  None  Barriers to dietary change: no    Diabetes Assessment  History of Diabetes: No    Weight Management  Start of Rehab Weight: 271.4  Height: 180.3 cm (5' 11\")  Weight: 267.2 lbs  BMI: 37.3    Nutrition Plan  Goal Status: In Progress  Nutrition Goals: Learn about healthy eating habits.  Lose 5-15 lbs. while in program.    Nutrition Interventions/Education: Body weight checked weekly.  Discuss fat screener results and recommendations.  90 Day Reassessment: Continuing to re-inforce healthy eating habits for pt.  Fatscreener & AHA H/O on fats reviewed and given to patient: Yes  Weight loss of 74.2 lbs since start of rehab. Troy states his diet has been \"okay\", will try to keep improving.    Exercise Assessment  Home Exercise: Occasional  Mode: Weights  Frequency: 2/wk  Duration: 15 mins    6 Minute Walk Assessment   6 MWT distance: Not done due to back pain with walking.    Exercise Prescription  Exercise Prescription based on: Duke Activity Status Index (DASI)  DASI Score: 12.7  MET Score: 4.3   Frequency:  2 days/week   Mode: Recumbent Cycle, Arm Ergometer, and " SciFit   Duration: 20-40 total aerobic minutes   Intensity: RPE 11-13  Target HR:  Rest +30  MET Level: 2+  Patient wears supplemental O2: Yes;   O2 Flow Rate: 2 to 6 L/min  SpO2 Range: 87 to 100 %     Modality Workload METs Duration (minutes)   1 Pre-Exercise      2 Scifit Stepper 1.2 2.7 10 :00   3 Hermilo Scifit 1.0  2.1 10 :00   4 Arm Ergometer UBE 1.2 2.3 10 :00   5 Arm Ergometer UBE 1.2 2.3 10 :00   6 Post-Exercise      Resistance Training: Yes   Home Exercise Prescription given: Yes  Previous instruction on free weights, pt states he does not need re-instruction.  Patient demonstrated proper technique and had no questions or concerns.  Home exercise program with stretching, balance exercises and weight exercises given.    Exercise Plan  Goal Status: In Progress  Exercise Goals: By discharge:  Increase exercise MET level by 5-10% each week  Increase total exercise duration to 30-45 minutes  Obtain 150 minutes/week of moderate intensity aerobic exercise  Initiate strength training 2-3 days a week  Initiate flexibility training 2-3 days a week  Establish a home exercise program before discharge    Exercise Interventions/Education: SPO2 goal is to add or titrate supplemnetal O2 as necessary at rest/exercise via nasal cannula or mask to prevent <88% SPO2.  Target goal for duration: 30-45 minutes; Increase MET level 5-10% each week or as tolerated.    Physical exercise restrictions: Back pain with walking, will not do TM. Unable to do RB.  90 Day Reassessment: Changes made based on HR and RPE/D responses to exercise.  Received education each week present.  Endurance of 40 minutes per session maintained.  Education Classes Completed: Using an Inhaler, Breathing Techniques, How to Live with COPD, Anxiety/Stress and Depression, Nutrition, Smoking Cessation, Exercise, Diabetes, Risk Factors.    Other Core Components/Risk Factor Assessment:  Medication adherence  Current Outpatient Medications on File Prior to Visit    Medication Sig Dispense Refill    budesonide-glycopyr-formoterol (Breztri Aerosphere) 160-9-4.8 mcg/actuation HFA aerosol inhaler Inhale 2 puffs 2 times a day.      carvedilol (Coreg) 12.5 mg tablet Take 18 mg by mouth twice a day.      cyanocobalamin (Vitamin B-12) 1,000 mcg tablet Orally      famotidine (Pepcid) 20 mg tablet Take by mouth once daily.      finasteride (Proscar) 5 mg tablet Take by mouth.      furosemide (Lasix) 40 mg tablet Take by mouth.      guaiFENesin (Humibid 3) 400 mg tablet Take 1 tablet (400 mg) by mouth every 4 hours if needed for cough.      loratadine (Claritin) 10 mg tablet Take by mouth.      magnesium oxide (Mag-Ox) 420 mg tablet Take 1 tablet (420 mg) by mouth once daily.      mirtazapine (Remeron) 15 mg tablet Take by mouth.      mometasone (Nasonex) 50 mcg/actuation nasal spray every 12 hours.      pantoprazole (ProtoNix) 20 mg EC tablet Take 1 tablet (20 mg) by mouth twice a day.      potassium chloride ER (Micro-K) 10 mEq ER capsule every 12 hours.      simvastatin (Zocor) 10 mg tablet Take by mouth.      traMADol (Ultram) 50 mg tablet Take 1.5 tablets (75 mg) by mouth.      trospium (Sanctura) 20 mg tablet Take 1 tablet (20 mg) by mouth once daily.      valsartan 80 mg tablet 80 mg, hydroCHLOROthiazide 12.5 mg tablet 12.5 mg Take 40 mg by mouth once daily.      venlafaxine (Effoxor) 12.5 MG split tablet Take 2 half tablet (25 mg) by mouth 2 times a day.       No current facility-administered medications on file prior to visit.               Medication compliance: Yes   Uses pill box/organizer: Yes    Carries medication list: No    Uses Inhalers appropriately: Yes     Blood Pressure Management  History of Hypertension: Yes   Medication Changes: No   Resting BP:  112/68    Heart Failure Management  Hx of Heart Failure: No    Smoking/Tobacco Assessment  Social History     Tobacco Use   Smoking Status Former    Current packs/day: 0.00    Average packs/day: 1 pack/day for 42.0  years (42.0 ttl pk-yrs)    Types: Cigarettes    Start date: 1967    Quit date: 2009    Years since quitting: 15.5    Passive exposure: Past   Smokeless Tobacco Never     Other Core Component Plan  Goal Status: In Progress  Other Core Component Goals: Learn to manage bronchial hygiene  Resting BP < 160/90.  Use all medications properly and as prescribed.  Remain cigarette free.    Frequent Cough?: No  Educational Assessment:  Pulmonary Knowledge Test:   Intake: 12  Discharge:     Other Core Component Interventions/Education: Ensure patient is compliant with all medications throughout program.  Explain bronchial hygiene techniques.  Pre/post resting BPs at each session.  90 Day Reassessment: Reviewed effects of exercise on BP: Yes  Reviewed knowledge test: Yes  Remains cigarette free at this time.  BP remains within target goal, compliant with meds. Pt reports no issues at this time.    Individual Patient Goals:  Be able to do more  Do more housework  Increase strength and endurance    Goal Status: In Progress    Staff Comments:  90 Day Reassessment: Troy has completed 16 sessions of pulmonary rehab. Endurance of 40 minutes per session maintained and METs have remained consistent at a max of 2.7. We will continue to increase resistance as able.     Rehab Staff Signature: Tony Mora, RRT

## 2024-07-09 ENCOUNTER — CLINICAL SUPPORT (OUTPATIENT)
Dept: CARDIAC REHAB | Facility: HOSPITAL | Age: 74
End: 2024-07-09
Payer: OTHER GOVERNMENT

## 2024-07-09 DIAGNOSIS — J44.9 CHRONIC OBSTRUCTIVE PULMONARY DISEASE, UNSPECIFIED COPD TYPE (MULTI): ICD-10-CM

## 2024-07-09 PROCEDURE — 94625 PHY/QHP OP PULM RHB W/O MNTR: CPT | Performed by: INTERNAL MEDICINE

## 2024-07-11 ENCOUNTER — CLINICAL SUPPORT (OUTPATIENT)
Dept: CARDIAC REHAB | Facility: HOSPITAL | Age: 74
End: 2024-07-11
Payer: OTHER GOVERNMENT

## 2024-07-11 DIAGNOSIS — J44.9 CHRONIC OBSTRUCTIVE PULMONARY DISEASE, UNSPECIFIED COPD TYPE (MULTI): ICD-10-CM

## 2024-07-11 PROCEDURE — 94625 PHY/QHP OP PULM RHB W/O MNTR: CPT | Performed by: INTERNAL MEDICINE

## 2024-07-16 ENCOUNTER — CLINICAL SUPPORT (OUTPATIENT)
Dept: CARDIAC REHAB | Facility: HOSPITAL | Age: 74
End: 2024-07-16
Payer: OTHER GOVERNMENT

## 2024-07-16 DIAGNOSIS — J44.9 CHRONIC OBSTRUCTIVE PULMONARY DISEASE, UNSPECIFIED COPD TYPE (MULTI): ICD-10-CM

## 2024-07-16 PROCEDURE — 94625 PHY/QHP OP PULM RHB W/O MNTR: CPT | Performed by: INTERNAL MEDICINE

## 2024-07-18 ENCOUNTER — CLINICAL SUPPORT (OUTPATIENT)
Dept: CARDIAC REHAB | Facility: HOSPITAL | Age: 74
End: 2024-07-18
Payer: OTHER GOVERNMENT

## 2024-07-18 DIAGNOSIS — J44.9 CHRONIC OBSTRUCTIVE PULMONARY DISEASE, UNSPECIFIED COPD TYPE (MULTI): ICD-10-CM

## 2024-07-18 PROCEDURE — 94625 PHY/QHP OP PULM RHB W/O MNTR: CPT | Performed by: INTERNAL MEDICINE

## 2024-07-23 ENCOUNTER — CLINICAL SUPPORT (OUTPATIENT)
Dept: CARDIAC REHAB | Facility: HOSPITAL | Age: 74
End: 2024-07-23
Payer: OTHER GOVERNMENT

## 2024-07-23 DIAGNOSIS — J44.9 CHRONIC OBSTRUCTIVE PULMONARY DISEASE, UNSPECIFIED COPD TYPE (MULTI): ICD-10-CM

## 2024-07-23 PROCEDURE — 94625 PHY/QHP OP PULM RHB W/O MNTR: CPT | Performed by: INTERNAL MEDICINE

## 2024-07-25 ENCOUNTER — CLINICAL SUPPORT (OUTPATIENT)
Dept: CARDIAC REHAB | Facility: HOSPITAL | Age: 74
End: 2024-07-25
Payer: OTHER GOVERNMENT

## 2024-07-25 DIAGNOSIS — J44.9 CHRONIC OBSTRUCTIVE PULMONARY DISEASE, UNSPECIFIED COPD TYPE (MULTI): ICD-10-CM

## 2024-07-25 PROCEDURE — 94625 PHY/QHP OP PULM RHB W/O MNTR: CPT | Performed by: INTERNAL MEDICINE

## 2024-07-30 ENCOUNTER — CLINICAL SUPPORT (OUTPATIENT)
Dept: CARDIAC REHAB | Facility: HOSPITAL | Age: 74
End: 2024-07-30
Payer: OTHER GOVERNMENT

## 2024-07-30 DIAGNOSIS — J44.9 CHRONIC OBSTRUCTIVE PULMONARY DISEASE, UNSPECIFIED COPD TYPE (MULTI): ICD-10-CM

## 2024-07-30 PROCEDURE — 94625 PHY/QHP OP PULM RHB W/O MNTR: CPT | Performed by: INTERNAL MEDICINE

## 2024-07-31 ENCOUNTER — DOCUMENTATION (OUTPATIENT)
Dept: CARDIAC REHAB | Facility: HOSPITAL | Age: 74
End: 2024-07-31
Payer: OTHER GOVERNMENT

## 2024-07-31 NOTE — PROGRESS NOTES
Pulmonary Rehabilitation   120 Day Reassessment    Name: Troy Melendez  Medical Record Number: [id number]  YOB: 1950  Age: 73 y.o.    Today’s Date: 7/31/2024  Primary Care Physician: Madi Aceves MD  Referring Physician: Dr. Bautista  Program Location: [facility]  Primary Diagnosis:   1. Chronic obstructive pulmonary disease, unspecified COPD type (Multi)        Onset/Date of Diagnosis: 2009    Session #: 23    Falls Risk: High  Psychosocial Assessment  Pre PHQ-9: 7  Post PHQ-9:   Sent PH-Q 9 to MD if score > 20: No; score < 20    Stress Management  Pt reported/currently experiencing stress: No  Patient uses stress management skills: Yes   History of: anxiety and depression  Currently seeing a mental health provider: Yes, Then provider name: Dr. Shawna KING  Social Support: Yes, Whom:Family, Friends  Pre CAT score: 25  Post CAT score:   Learning Assessment:  Learning assessment/barriers: None  Preferred learning method: Auditory, Visual, and Reading handout  Barriers: None  Stages of Change:Action    Psychosocial Plan  Goal Status: In Progress  Psychosocial Goals: Decrease CAT score  Decrease PHQ-9 score.  Learn about living with chronic lung disease.  Learn to use stress management techniques.    Psychosocial Interventions/Education: Educate patient on how to live with chronic lung disease.  Question patient on new or existing stress/anxiety issues at least once every 30 days.  120 Day Reassessment: Patient reports no new stress, anxiety or depression at this time.    Oxygen Assessment  SpO2 at rest: 96 % on 2L  SpO2 with exertion: 87% on 2L    Oxygen Use  Supplemental O2 prescribed: Yes,   Liters at rest: 0 L  Liters with exertion: 2-4 L    Using O2 as prescribed: Yes  [facility]   Demonstrates appropriate knowledge of O2 use: No   Demonstrates appropriate knowledge of O2 safety: No   Home O2 System: Concentrator  Portable O2 System: Portable Oxygen Concentrator  Hypoxia managed: Yes  "  Home Pulse Oximeter: Yes     Pre MMRC: 3  Post MMRC:     Oxygen Plan  Goal Status: In Progress  Oxygen Goals: Decrease MMRC score  Learn and use diaphragmatic breathing as needed.  Learn and use pursed lip breathing as needed.  Titrate supplemental O2 as needed to keep SpO2 at 88% or greater.    History of SARAH?: Yes, uses cpap regularly    Oxygen Education/Interventions: Learning to use pursed lip breathing during exercise to help lessen dyspnea.  SpO2 checks with pulse oximeter at rest and with each modality.  Supplemental O2 as needed.  Titrate supplemental O2 as needed to keep SpO2 at 88% or greater.  120 Day Reassessment: Pt educated on diaphragmatic and pursed lip breathing techniques: Yes  Continuing to reinforce proper breathing techniques.  Spo2 of 87 or > maintained on 2L with exercise.    Nutrition Assessment:  Current Dietary Guidelines:  None  Barriers to dietary change: no    Diabetes Assessment  History of Diabetes: No    Weight Management  Start of Rehab Weight: 271.4  Height: 180.3 cm (5' 11\")  Weight: 264.2 lbs  BMI: 36.8    Nutrition Plan  Goal Status: In Progress  Nutrition Goals: Learn about healthy eating habits.  Lose 5-15 lbs. while in program.    Nutrition Interventions/Education: Body weight checked weekly.  Discuss fat screener results and recommendations.  120 Day Reassessment: Continuing to re-inforce healthy eating habits for pt.  Fatscreener & AHA H/O on fats reviewed and given to patient: Yes  Weight loss of 7.2 lbs since start of rehab. Troy states his diet has been good.    Exercise Assessment  Home Exercise: Occasional  Mode: Weights  Frequency: 2/wk  Duration: 15 mins    6 Minute Walk Assessment   6 MWT distance: Not done due to back pain with walking.    Exercise Prescription  Exercise Prescription based on: Duke Activity Status Index (DASI)  DASI Score: 12.7  MET Score: 4.3   Frequency:  2 days/week   Mode: Recumbent Cycle, Arm Ergometer, and SciFit   Duration: 20-40 total " aerobic minutes   Intensity: RPE 11-13  Target HR:  Rest +30  MET Level: 2+  Patient wears supplemental O2: Yes;   O2 Flow Rate: 2 to 6 L/min  SpO2 Range: 87 to 100 %     Modality Workload METs Duration (minutes)   1 Pre-Exercise      2 Scifit Stepper 1.8 3.3 10 :00   3 Scifit Stepper 1.8 3.3 10 :00   4 Arm Ergometer UBE 1.8 2.9 10 :00   5 Arm Ergometer UBE 1.8 2.9 10 :00   6 Post-Exercise      Resistance Training: Yes   Home Exercise Prescription given: Yes  Previous instruction on free weights, pt states he does not need re-instruction.  Patient demonstrated proper technique and had no questions or concerns.  Home exercise program with stretching, balance exercises and weight exercises given.    Exercise Plan  Goal Status: In Progress  Exercise Goals: By discharge:  Increase exercise MET level by 5-10% each week  Increase total exercise duration to 30-45 minutes  Obtain 150 minutes/week of moderate intensity aerobic exercise  Initiate strength training 2-3 days a week  Initiate flexibility training 2-3 days a week  Establish a home exercise program before discharge    Exercise Interventions/Education: SPO2 goal is to add or titrate supplemnetal O2 as necessary at rest/exercise via nasal cannula or mask to prevent <88% SPO2.  Target goal for duration: 30-45 minutes; Increase MET level 5-10% each week or as tolerated.    Physical exercise restrictions: Back pain with walking, will not do TM. Unable to do RB.  120 Day Reassessment: Changes made based on HR and RPE/D responses to exercise.  Received education each week present.  Endurance of 40 minutes per session maintained.  Education Classes Completed: Using an Inhaler, Breathing Techniques, How to Live with COPD, Anxiety/Stress and Depression, Nutrition, Smoking Cessation, Exercise, Diabetes, Risk Factors.    Other Core Components/Risk Factor Assessment:  Medication adherence  Current Outpatient Medications on File Prior to Visit   Medication Sig Dispense Refill     budesonide-glycopyr-formoterol (Breztri Aerosphere) 160-9-4.8 mcg/actuation HFA aerosol inhaler Inhale 2 puffs 2 times a day.      carvedilol (Coreg) 12.5 mg tablet Take 18 mg by mouth twice a day.      cyanocobalamin (Vitamin B-12) 1,000 mcg tablet Orally      famotidine (Pepcid) 20 mg tablet Take by mouth once daily.      finasteride (Proscar) 5 mg tablet Take by mouth.      furosemide (Lasix) 40 mg tablet Take by mouth.      guaiFENesin (Humibid 3) 400 mg tablet Take 1 tablet (400 mg) by mouth every 4 hours if needed for cough.      loratadine (Claritin) 10 mg tablet Take by mouth.      magnesium oxide (Mag-Ox) 420 mg tablet Take 1 tablet (420 mg) by mouth once daily.      mirtazapine (Remeron) 15 mg tablet Take by mouth.      mometasone (Nasonex) 50 mcg/actuation nasal spray every 12 hours.      pantoprazole (ProtoNix) 20 mg EC tablet Take 1 tablet (20 mg) by mouth twice a day.      potassium chloride ER (Micro-K) 10 mEq ER capsule every 12 hours.      simvastatin (Zocor) 10 mg tablet Take by mouth.      traMADol (Ultram) 50 mg tablet Take 1.5 tablets (75 mg) by mouth.      trospium (Sanctura) 20 mg tablet Take 1 tablet (20 mg) by mouth once daily.      valsartan 80 mg tablet 80 mg, hydroCHLOROthiazide 12.5 mg tablet 12.5 mg Take 40 mg by mouth once daily.      venlafaxine (Effoxor) 12.5 MG split tablet Take 2 half tablet (25 mg) by mouth 2 times a day.       No current facility-administered medications on file prior to visit.               Medication compliance: Yes   Uses pill box/organizer: Yes    Carries medication list: No    Uses Inhalers appropriately: Yes     Blood Pressure Management  History of Hypertension: Yes   Medication Changes: No   Resting BP:  114/57    Heart Failure Management  Hx of Heart Failure: No    Smoking/Tobacco Assessment  Social History     Tobacco Use   Smoking Status Former    Current packs/day: 0.00    Average packs/day: 1 pack/day for 42.0 years (42.0 ttl pk-yrs)    Types:  Cigarettes    Start date: 1967    Quit date: 2009    Years since quitting: 15.5    Passive exposure: Past   Smokeless Tobacco Never     Other Core Component Plan  Goal Status: In Progress  Other Core Component Goals: Learn to manage bronchial hygiene  Resting BP < 160/90.  Use all medications properly and as prescribed.  Remain cigarette free.    Frequent Cough?: No  Educational Assessment:  Pulmonary Knowledge Test:   Intake: 12  Discharge:     Other Core Component Interventions/Education: Ensure patient is compliant with all medications throughout program.  Explain bronchial hygiene techniques.  Pre/post resting BPs at each session.  120 Day Reassessment: Reviewed effects of exercise on BP: Yes  Reviewed knowledge test: Yes  Remains cigarette free at this time.  BP remains within target goal, compliant with meds. Pt reports no issues at this time.    Individual Patient Goals:  Be able to do more  Do more housework  Increase strength and endurance    Goal Status: In Progress    Staff Comments:  120 Day Reassessment: Troy has completed 23 sessions of pulmonary rehab. Endurance of 40 minutes per session maintained and METs have increased to a max of 3.3. We will continue to increase resistance as able.     Rehab Staff Signature: Tony Mora, RRT

## 2024-08-01 ENCOUNTER — CLINICAL SUPPORT (OUTPATIENT)
Dept: CARDIAC REHAB | Facility: HOSPITAL | Age: 74
End: 2024-08-01
Payer: OTHER GOVERNMENT

## 2024-08-01 DIAGNOSIS — J44.9 CHRONIC OBSTRUCTIVE PULMONARY DISEASE, UNSPECIFIED COPD TYPE (MULTI): ICD-10-CM

## 2024-08-01 PROCEDURE — 94625 PHY/QHP OP PULM RHB W/O MNTR: CPT | Performed by: INTERNAL MEDICINE

## 2024-08-06 ENCOUNTER — CLINICAL SUPPORT (OUTPATIENT)
Dept: CARDIAC REHAB | Facility: HOSPITAL | Age: 74
End: 2024-08-06
Payer: OTHER GOVERNMENT

## 2024-08-06 DIAGNOSIS — J44.9 CHRONIC OBSTRUCTIVE PULMONARY DISEASE, UNSPECIFIED COPD TYPE (MULTI): ICD-10-CM

## 2024-08-06 PROCEDURE — 94625 PHY/QHP OP PULM RHB W/O MNTR: CPT | Performed by: INTERNAL MEDICINE

## 2024-08-08 ENCOUNTER — CLINICAL SUPPORT (OUTPATIENT)
Dept: CARDIAC REHAB | Facility: HOSPITAL | Age: 74
End: 2024-08-08
Payer: OTHER GOVERNMENT

## 2024-08-08 DIAGNOSIS — J44.9 CHRONIC OBSTRUCTIVE PULMONARY DISEASE, UNSPECIFIED COPD TYPE (MULTI): ICD-10-CM

## 2024-08-08 PROCEDURE — 94625 PHY/QHP OP PULM RHB W/O MNTR: CPT | Performed by: INTERNAL MEDICINE

## 2024-08-13 ENCOUNTER — CLINICAL SUPPORT (OUTPATIENT)
Dept: CARDIAC REHAB | Facility: HOSPITAL | Age: 74
End: 2024-08-13
Payer: OTHER GOVERNMENT

## 2024-08-13 DIAGNOSIS — J44.9 CHRONIC OBSTRUCTIVE PULMONARY DISEASE, UNSPECIFIED COPD TYPE (MULTI): ICD-10-CM

## 2024-08-13 PROCEDURE — 94625 PHY/QHP OP PULM RHB W/O MNTR: CPT | Performed by: INTERNAL MEDICINE

## 2024-08-15 ENCOUNTER — APPOINTMENT (OUTPATIENT)
Dept: CARDIAC REHAB | Facility: HOSPITAL | Age: 74
End: 2024-08-15
Payer: OTHER GOVERNMENT

## 2024-08-20 ENCOUNTER — APPOINTMENT (OUTPATIENT)
Dept: CARDIAC REHAB | Facility: HOSPITAL | Age: 74
End: 2024-08-20
Payer: OTHER GOVERNMENT

## 2024-08-22 ENCOUNTER — APPOINTMENT (OUTPATIENT)
Dept: CARDIAC REHAB | Facility: HOSPITAL | Age: 74
End: 2024-08-22
Payer: OTHER GOVERNMENT

## 2024-08-27 ENCOUNTER — CLINICAL SUPPORT (OUTPATIENT)
Dept: CARDIAC REHAB | Facility: HOSPITAL | Age: 74
End: 2024-08-27
Payer: OTHER GOVERNMENT

## 2024-08-27 DIAGNOSIS — J44.9 CHRONIC OBSTRUCTIVE PULMONARY DISEASE, UNSPECIFIED COPD TYPE (MULTI): ICD-10-CM

## 2024-08-27 PROCEDURE — 94625 PHY/QHP OP PULM RHB W/O MNTR: CPT | Performed by: INTERNAL MEDICINE

## 2024-08-28 ENCOUNTER — DOCUMENTATION (OUTPATIENT)
Dept: CARDIAC REHAB | Facility: HOSPITAL | Age: 74
End: 2024-08-28
Payer: OTHER GOVERNMENT

## 2024-08-28 NOTE — PROGRESS NOTES
Pulmonary Rehabilitation   150 Day Reassessment    Name: Troy Melendez  Medical Record Number: [id number]  YOB: 1950  Age: 73 y.o.    Today’s Date: 8/28/2024  Primary Care Physician: Madi Aceves MD  Referring Physician: Dr. Bautista  Program Location: [facility]  Primary Diagnosis:   1. Chronic obstructive pulmonary disease, unspecified COPD type (Multi)        Onset/Date of Diagnosis: 2009    Session #: 28  Falls Risk: High  Psychosocial Assessment  Pre PHQ-9: 7  Post PHQ-9:   Sent PH-Q 9 to MD if score > 20: No; score < 20    Stress Management  Pt reported/currently experiencing stress: No  Patient uses stress management skills: Yes   History of: anxiety and depression  Currently seeing a mental health provider: Yes, Then provider name: Dr. Shawna KING  Social Support: Yes, Whom:Family, Friends  Pre CAT score: 25  Post CAT score:   Learning Assessment:  Learning assessment/barriers: None  Preferred learning method: Auditory, Visual, and Reading handout  Barriers: None  Stages of Change:Action    Psychosocial Plan  Goal Status: In Progress  Psychosocial Goals: Decrease CAT score  Decrease PHQ-9 score.  Learn about living with chronic lung disease.  Learn to use stress management techniques.    Psychosocial Interventions/Education: Educate patient on how to live with chronic lung disease.  Question patient on new or existing stress/anxiety issues at least once every 30 days.  150 Day Reassessment: Patient reports no new stress, anxiety or depression at this time.    Oxygen Assessment  SpO2 at rest: 96 % on 2L  SpO2 with exertion: 87% on 2L    Oxygen Use  Supplemental O2 prescribed: Yes,   Liters at rest: 0 L  Liters with exertion: 2-4 L    Using O2 as prescribed: Yes  [facility]   Demonstrates appropriate knowledge of O2 use: No   Demonstrates appropriate knowledge of O2 safety: No   Home O2 System: Concentrator  Portable O2 System: Portable Oxygen Concentrator  Hypoxia managed: Yes  "  Home Pulse Oximeter: Yes     Pre MMRC: 3  Post MMRC:     Oxygen Plan  Goal Status: In Progress  Oxygen Goals: Decrease MMRC score  Learn and use diaphragmatic breathing as needed.  Learn and use pursed lip breathing as needed.  Titrate supplemental O2 as needed to keep SpO2 at 88% or greater.    History of SARAH?: Yes, uses cpap regularly    Oxygen Education/Interventions: Learning to use pursed lip breathing during exercise to help lessen dyspnea.  SpO2 checks with pulse oximeter at rest and with each modality.  Supplemental O2 as needed.  Titrate supplemental O2 as needed to keep SpO2 at 88% or greater.  150 Day Reassessment: Pt educated on diaphragmatic and pursed lip breathing techniques: Yes  Continuing to reinforce proper breathing techniques.  Spo2 of 87 or > maintained on 2L with exercise.    Nutrition Assessment:  Current Dietary Guidelines:  None  Barriers to dietary change: no    Diabetes Assessment  History of Diabetes: No    Weight Management  Start of Rehab Weight: 271.4  Height: 180.3 cm (5' 11\")  Weight: 268 lbs  BMI: 37.4    Nutrition Plan  Goal Status: In Progress  Nutrition Goals: Learn about healthy eating habits.  Lose 5-15 lbs. while in program.    Nutrition Interventions/Education: Body weight checked weekly.  Discuss fat screener results and recommendations.  150 Day Reassessment: Continuing to re-inforce healthy eating habits for pt.  Fatscreener & AHA H/O on fats reviewed and given to patient: Yes  Weight loss of 3.4 lbs since start of rehab. Troy states his diet has been good.    Exercise Assessment  Home Exercise: Occasional  Mode: Weights  Frequency: 2/wk  Duration: 15 mins    6 Minute Walk Assessment   6 MWT distance: Not done due to back pain with walking.    Exercise Prescription  Exercise Prescription based on: Duke Activity Status Index (DASI)  DASI Score: 12.7  MET Score: 4.3   Frequency:  2 days/week   Mode: Recumbent Cycle, Arm Ergometer, and SciFit   Duration: 20-40 total " aerobic minutes   Intensity: RPE 11-13  Target HR:  Rest +30  MET Level: 2+  Patient wears supplemental O2: Yes;   O2 Flow Rate: 2 to 6 L/min  SpO2 Range: 87 to 100 %     Modality Workload METs Duration (minutes)   1 Pre-Exercise      2 Scifit Stepper 2.0 3.5 10 :00   3 Scifit Stepper 2.0 3.5 10 :00   4 Arm Ergometer UBE 2.0 3.1 10 :00   5 Arm Ergometer UBE 2.0 3.1 10 :00   6 Post-Exercise      Resistance Training: Yes   Home Exercise Prescription given: Yes  Previous instruction on free weights, pt states he does not need re-instruction.  Patient demonstrated proper technique and had no questions or concerns.  Home exercise program with stretching, balance exercises and weight exercises given.    Exercise Plan  Goal Status: In Progress  Exercise Goals: By discharge:  Increase exercise MET level by 5-10% each week  Increase total exercise duration to 30-45 minutes  Obtain 150 minutes/week of moderate intensity aerobic exercise  Initiate strength training 2-3 days a week  Initiate flexibility training 2-3 days a week  Establish a home exercise program before discharge    Exercise Interventions/Education: SPO2 goal is to add or titrate supplemnetal O2 as necessary at rest/exercise via nasal cannula or mask to prevent <88% SPO2.  Target goal for duration: 30-45 minutes; Increase MET level 5-10% each week or as tolerated.    Physical exercise restrictions: Back pain with walking, will not do TM. Unable to do RB.  150 Day Reassessment: Changes made based on HR and RPE/D responses to exercise.  Received education each week present.  Endurance of 40 minutes per session maintained.  Education Classes Completed: Using an Inhaler, Breathing Techniques, How to Live with COPD, Anxiety/Stress and Depression, Nutrition, Smoking Cessation, Exercise, Diabetes, Risk Factors, Bronchial Hygiene.    Other Core Components/Risk Factor Assessment:  Medication adherence  Current Outpatient Medications on File Prior to Visit   Medication  Sig Dispense Refill    budesonide-glycopyr-formoterol (Breztri Aerosphere) 160-9-4.8 mcg/actuation HFA aerosol inhaler Inhale 2 puffs 2 times a day.      carvedilol (Coreg) 12.5 mg tablet Take 18 mg by mouth twice a day.      cyanocobalamin (Vitamin B-12) 1,000 mcg tablet Orally      famotidine (Pepcid) 20 mg tablet Take by mouth once daily.      finasteride (Proscar) 5 mg tablet Take by mouth.      furosemide (Lasix) 40 mg tablet Take by mouth.      guaiFENesin (Humibid 3) 400 mg tablet Take 1 tablet (400 mg) by mouth every 4 hours if needed for cough.      loratadine (Claritin) 10 mg tablet Take by mouth.      magnesium oxide (Mag-Ox) 420 mg tablet Take 1 tablet (420 mg) by mouth once daily.      mirtazapine (Remeron) 15 mg tablet Take by mouth.      mometasone (Nasonex) 50 mcg/actuation nasal spray every 12 hours.      pantoprazole (ProtoNix) 20 mg EC tablet Take 1 tablet (20 mg) by mouth twice a day.      potassium chloride ER (Micro-K) 10 mEq ER capsule every 12 hours.      simvastatin (Zocor) 10 mg tablet Take by mouth.      traMADol (Ultram) 50 mg tablet Take 1.5 tablets (75 mg) by mouth.      trospium (Sanctura) 20 mg tablet Take 1 tablet (20 mg) by mouth once daily.      valsartan 80 mg tablet 80 mg, hydroCHLOROthiazide 12.5 mg tablet 12.5 mg Take 40 mg by mouth once daily.      venlafaxine (Effoxor) 12.5 MG split tablet Take 2 half tablet (25 mg) by mouth 2 times a day.       No current facility-administered medications on file prior to visit.               Medication compliance: Yes   Uses pill box/organizer: Yes    Carries medication list: No    Uses Inhalers appropriately: Yes     Blood Pressure Management  History of Hypertension: Yes   Medication Changes: No   Resting BP: 128/75    Heart Failure Management  Hx of Heart Failure: No    Smoking/Tobacco Assessment  Social History     Tobacco Use   Smoking Status Former    Current packs/day: 0.00    Average packs/day: 1 pack/day for 42.0 years (42.0 ttl  pk-yrs)    Types: Cigarettes    Start date: 1967    Quit date: 2009    Years since quitting: 15.6    Passive exposure: Past   Smokeless Tobacco Never     Other Core Component Plan  Goal Status: In Progress  Other Core Component Goals: Learn to manage bronchial hygiene  Resting BP < 160/90.  Use all medications properly and as prescribed.  Remain cigarette free.    Frequent Cough?: No  Educational Assessment:  Pulmonary Knowledge Test:   Intake: 12/15  Discharge:     Other Core Component Interventions/Education: Ensure patient is compliant with all medications throughout program.  Explain bronchial hygiene techniques.  Pre/post resting BPs at each session.  150 Day Reassessment: Reviewed effects of exercise on BP: Yes  Reviewed knowledge test: Yes  Remains cigarette free at this time.  BP remains within target goal, compliant with meds. Pt reports no issues at this time.    Individual Patient Goals:  Be able to do more  Do more housework  Increase strength and endurance    Goal Status: In Progress    Staff Comments:  150 Day Reassessment: Troy has completed 28 sessions of pulmonary rehab. Endurance of 40 minutes per session maintained and METs have increased to a max of 3.5. We will continue to increase resistance as able. Troy recently had a procedure for his back issues and will return once he is able.    Rehab Staff Signature: Tony Mora, RRT

## 2024-08-29 ENCOUNTER — APPOINTMENT (OUTPATIENT)
Dept: CARDIAC REHAB | Facility: HOSPITAL | Age: 74
End: 2024-08-29
Payer: OTHER GOVERNMENT

## 2024-09-03 ENCOUNTER — APPOINTMENT (OUTPATIENT)
Dept: CARDIAC REHAB | Facility: HOSPITAL | Age: 74
End: 2024-09-03
Payer: OTHER GOVERNMENT

## 2024-09-03 DIAGNOSIS — J44.9 CHRONIC OBSTRUCTIVE PULMONARY DISEASE, UNSPECIFIED COPD TYPE (MULTI): ICD-10-CM

## 2024-09-03 PROCEDURE — 94625 PHY/QHP OP PULM RHB W/O MNTR: CPT | Performed by: INTERNAL MEDICINE

## 2024-09-05 ENCOUNTER — APPOINTMENT (OUTPATIENT)
Dept: CARDIAC REHAB | Facility: HOSPITAL | Age: 74
End: 2024-09-05
Payer: OTHER GOVERNMENT

## 2024-09-05 DIAGNOSIS — J44.9 CHRONIC OBSTRUCTIVE PULMONARY DISEASE, UNSPECIFIED COPD TYPE (MULTI): ICD-10-CM

## 2024-09-05 PROCEDURE — 94625 PHY/QHP OP PULM RHB W/O MNTR: CPT | Performed by: INTERNAL MEDICINE

## 2024-09-10 ENCOUNTER — APPOINTMENT (OUTPATIENT)
Dept: CARDIAC REHAB | Facility: HOSPITAL | Age: 74
End: 2024-09-10
Payer: OTHER GOVERNMENT

## 2024-09-10 DIAGNOSIS — J44.9 CHRONIC OBSTRUCTIVE PULMONARY DISEASE, UNSPECIFIED COPD TYPE (MULTI): ICD-10-CM

## 2024-09-10 PROCEDURE — 94625 PHY/QHP OP PULM RHB W/O MNTR: CPT | Performed by: INTERNAL MEDICINE

## 2024-09-12 ENCOUNTER — APPOINTMENT (OUTPATIENT)
Dept: CARDIAC REHAB | Facility: HOSPITAL | Age: 74
End: 2024-09-12
Payer: OTHER GOVERNMENT

## 2024-09-12 DIAGNOSIS — J44.9 CHRONIC OBSTRUCTIVE PULMONARY DISEASE, UNSPECIFIED COPD TYPE (MULTI): ICD-10-CM

## 2024-09-12 PROCEDURE — 94625 PHY/QHP OP PULM RHB W/O MNTR: CPT | Performed by: INTERNAL MEDICINE

## 2024-09-17 ENCOUNTER — APPOINTMENT (OUTPATIENT)
Dept: CARDIAC REHAB | Facility: HOSPITAL | Age: 74
End: 2024-09-17
Payer: OTHER GOVERNMENT

## 2024-09-17 DIAGNOSIS — J44.9 CHRONIC OBSTRUCTIVE PULMONARY DISEASE, UNSPECIFIED COPD TYPE (MULTI): ICD-10-CM

## 2024-09-17 PROCEDURE — 94625 PHY/QHP OP PULM RHB W/O MNTR: CPT | Performed by: INTERNAL MEDICINE

## 2024-09-19 ENCOUNTER — APPOINTMENT (OUTPATIENT)
Dept: CARDIAC REHAB | Facility: HOSPITAL | Age: 74
End: 2024-09-19
Payer: OTHER GOVERNMENT

## 2024-09-19 DIAGNOSIS — J44.9 CHRONIC OBSTRUCTIVE PULMONARY DISEASE, UNSPECIFIED COPD TYPE (MULTI): ICD-10-CM

## 2024-09-19 PROCEDURE — 94625 PHY/QHP OP PULM RHB W/O MNTR: CPT | Performed by: INTERNAL MEDICINE

## 2024-09-24 ENCOUNTER — APPOINTMENT (OUTPATIENT)
Dept: CARDIAC REHAB | Facility: HOSPITAL | Age: 74
End: 2024-09-24
Payer: OTHER GOVERNMENT

## 2024-09-24 DIAGNOSIS — J44.9 CHRONIC OBSTRUCTIVE PULMONARY DISEASE, UNSPECIFIED COPD TYPE (MULTI): ICD-10-CM

## 2024-09-24 PROCEDURE — 94625 PHY/QHP OP PULM RHB W/O MNTR: CPT | Performed by: INTERNAL MEDICINE

## 2024-09-26 ENCOUNTER — APPOINTMENT (OUTPATIENT)
Dept: CARDIAC REHAB | Facility: HOSPITAL | Age: 74
End: 2024-09-26
Payer: OTHER GOVERNMENT

## 2024-10-01 ENCOUNTER — APPOINTMENT (OUTPATIENT)
Dept: CARDIAC REHAB | Facility: HOSPITAL | Age: 74
End: 2024-10-01
Payer: OTHER GOVERNMENT

## 2024-10-01 DIAGNOSIS — J44.9 CHRONIC OBSTRUCTIVE PULMONARY DISEASE, UNSPECIFIED COPD TYPE (MULTI): ICD-10-CM

## 2024-10-01 PROCEDURE — 94625 PHY/QHP OP PULM RHB W/O MNTR: CPT | Performed by: INTERNAL MEDICINE

## 2024-10-01 NOTE — PROGRESS NOTES
Pulmonary Rehabilitation   Discharge Assessment    Name: Troy Melendez  Medical Record Number: [id number]  YOB: 1950  Age: 73 y.o.    Today’s Date: 10/1/2024  Primary Care Physician: Madi Aceves MD  Referring Physician: Dr. Bautista  Program Location: [facility]  Primary Diagnosis:   1. Chronic obstructive pulmonary disease, unspecified COPD type (Multi)        Onset/Date of Diagnosis: 2009    Sessions Completed: 36  Falls Risk: High  Psychosocial Assessment  Pre PHQ-9: 7  Post PHQ-9: 3  Sent PH-Q 9 to MD if score > 20: No; score < 20    Stress Management  Pt reported/currently experiencing stress: No  Patient uses stress management skills: Yes   History of: anxiety and depression  Currently seeing a mental health provider: Yes, Then provider name: Dr. Shawna KING  Social Support: Yes, Whom:Family, Friends  Pre CAT score: 25  Post CAT score: 14  Learning Assessment:  Learning assessment/barriers: None  Preferred learning method: Auditory, Visual, and Reading handout  Barriers: None  Stages of Change:Action    Psychosocial Plan  Goal Status: Met  Psychosocial Goals: Decrease CAT score  Decrease PHQ-9 score.  Learn about living with chronic lung disease.  Learn to use stress management techniques.    Psychosocial Interventions/Education: Educate patient on how to live with chronic lung disease.  Question patient on new or existing stress/anxiety issues at least once every 30 days.  Discharge Assessment: Patient reports no new stress, anxiety or depression at this time.  CAT score improved from 25 to 14.  PHQ-9 improved from 7 to 3.    Oxygen Assessment  SpO2 at rest: 96 % on 2L  SpO2 with exertion: 87% on 2L    Oxygen Use  Supplemental O2 prescribed: Yes,   Liters at rest: 0 L  Liters with exertion: 2-4 L    Using O2 as prescribed: Yes  [facility]   Demonstrates appropriate knowledge of O2 use: No   Demonstrates appropriate knowledge of O2 safety: No   Home O2 System: Concentrator  Portable  "O2 System: Portable Oxygen Concentrator  Hypoxia managed: Yes   Home Pulse Oximeter: Yes     Pre MMRC: 3  Post MMRC: 1    Oxygen Plan  Goal Status: Met  Oxygen Goals: Decrease MMRC score  Learn and use diaphragmatic breathing as needed.  Learn and use pursed lip breathing as needed.  Titrate supplemental O2 as needed to keep SpO2 at 88% or greater.    History of SARAH?: Yes, uses cpap regularly    Oxygen Education/Interventions: Learning to use pursed lip breathing during exercise to help lessen dyspnea.  SpO2 checks with pulse oximeter at rest and with each modality.  Supplemental O2 as needed.  Titrate supplemental O2 as needed to keep SpO2 at 88% or greater.  Discharge Assessment: Pt educated on diaphragmatic and pursed lip breathing techniques: Yes  Continuing to reinforce proper breathing techniques.  Spo2 of 87 or > maintained on 2L with exercise.  MMRC score improved from 3 to 1.  Pt demonstrates proper use of PLB and DB.    Nutrition Assessment:  Current Dietary Guidelines:  None  Barriers to dietary change: no    Diabetes Assessment  History of Diabetes: No    Weight Management  Start of Rehab Weight: 271.4  Height: 180.3 cm (5' 11\")  Weight: 272.6 lbs  BMI: 38.0    Nutrition Plan  Goal Status: Partially Met  Nutrition Goals: Learn about healthy eating habits.  Lose 5-15 lbs. while in program.    Nutrition Interventions/Education: Body weight checked weekly.  Discuss fat screener results and recommendations.  Discharge Assessment: Continuing to re-inforce healthy eating habits for pt.  Fatscreener & AHA H/O on fats reviewed and given to patient: Yes  Weight gain of 1.2 lbs since start of rehab. Troy states he is trying to improve his diet.    Exercise Assessment  Home Exercise: Occasional  Mode: Weights  Frequency: 2/wk  Duration: 15 mins    6 Minute Walk Assessment   6 MWT distance: Not done due to back pain with walking.    Exercise Prescription  Exercise Prescription based on: Duke Activity Status Index " (DASI)  DASI Score: 12.7  MET Score: 4.3   Frequency:  2 days/week   Mode: Recumbent Cycle, Arm Ergometer, and SciFit   Duration: 20-40 total aerobic minutes   Intensity: RPE 11-13  Target HR:  Rest +30  MET Level: 2+  Patient wears supplemental O2: Yes;   O2 Flow Rate: 2 to 6 L/min  SpO2 Range: 87 to 100 %     Modality Workload METs Duration (minutes)   1 Pre-Exercise      2 Scifit Stepper 2.0 3.5 10 :00   3 Scifit Stepper 2.0 3.5 10 :00   4 Arm Ergometer UBE 2.0 3.1 10 :00   5 Arm Ergometer UBE 2.0 3.1 10 :00   6 Post-Exercise      Resistance Training: Yes   Home Exercise Prescription given: Yes  Previous instruction on free weights, pt states he does not need re-instruction.  Patient demonstrated proper technique and had no questions or concerns.  Home exercise program with stretching, balance exercises and weight exercises given.    Exercise Plan  Goal Status: Partially Met  Exercise Goals: By discharge:  Increase exercise MET level by 5-10% each week  Increase total exercise duration to 30-45 minutes  Obtain 150 minutes/week of moderate intensity aerobic exercise  Initiate strength training 2-3 days a week  Initiate flexibility training 2-3 days a week  Establish a home exercise program before discharge    Exercise Interventions/Education: SPO2 goal is to add or titrate supplemnetal O2 as necessary at rest/exercise via nasal cannula or mask to prevent <88% SPO2.  Target goal for duration: 30-45 minutes; Increase MET level 5-10% each week or as tolerated.    Physical exercise restrictions: Back pain with walking, will not do TM. Unable to do RB.  Discharge Assessment: Changes made based on HR and RPE/D responses to exercise.  Received education each week present.  Endurance of 40 minutes per session maintained.  Education Classes Completed: Using an Inhaler, Breathing Techniques, How to Live with COPD, Anxiety/Stress and Depression, Nutrition, Smoking Cessation, Exercise, Diabetes, Risk Factors, Bronchial  Hygiene, Preventing Infection.  DASI Score:  Pre: 12.7  Post: 18.95    Other Core Components/Risk Factor Assessment:  Medication adherence  Current Outpatient Medications on File Prior to Visit   Medication Sig Dispense Refill    budesonide-glycopyr-formoterol (Breztri Aerosphere) 160-9-4.8 mcg/actuation HFA aerosol inhaler Inhale 2 puffs 2 times a day.      carvedilol (Coreg) 12.5 mg tablet Take 18 mg by mouth twice a day.      cyanocobalamin (Vitamin B-12) 1,000 mcg tablet Orally      famotidine (Pepcid) 20 mg tablet Take by mouth once daily.      finasteride (Proscar) 5 mg tablet Take by mouth.      furosemide (Lasix) 40 mg tablet Take by mouth.      guaiFENesin (Humibid 3) 400 mg tablet Take 1 tablet (400 mg) by mouth every 4 hours if needed for cough.      loratadine (Claritin) 10 mg tablet Take by mouth.      magnesium oxide (Mag-Ox) 420 mg tablet Take 1 tablet (420 mg) by mouth once daily.      mirtazapine (Remeron) 15 mg tablet Take by mouth.      mometasone (Nasonex) 50 mcg/actuation nasal spray every 12 hours.      pantoprazole (ProtoNix) 20 mg EC tablet Take 1 tablet (20 mg) by mouth twice a day.      potassium chloride ER (Micro-K) 10 mEq ER capsule every 12 hours.      simvastatin (Zocor) 10 mg tablet Take by mouth.      traMADol (Ultram) 50 mg tablet Take 1.5 tablets (75 mg) by mouth.      trospium (Sanctura) 20 mg tablet Take 1 tablet (20 mg) by mouth once daily.      valsartan 80 mg tablet 80 mg, hydroCHLOROthiazide 12.5 mg tablet 12.5 mg Take 40 mg by mouth once daily.      venlafaxine (Effoxor) 12.5 MG split tablet Take 2 half tablet (25 mg) by mouth 2 times a day.       No current facility-administered medications on file prior to visit.               Medication compliance: Yes   Uses pill box/organizer: Yes    Carries medication list: No    Uses Inhalers appropriately: Yes     Blood Pressure Management  History of Hypertension: Yes   Medication Changes: No   Resting BP: 134/69    Heart Failure  Management  Hx of Heart Failure: No    Smoking/Tobacco Assessment  Social History     Tobacco Use   Smoking Status Former    Current packs/day: 0.00    Average packs/day: 1 pack/day for 42.0 years (42.0 ttl pk-yrs)    Types: Cigarettes    Start date: 1967    Quit date: 2009    Years since quitting: 15.7    Passive exposure: Past   Smokeless Tobacco Never     Other Core Component Plan  Goal Status: Met  Other Core Component Goals: Learn to manage bronchial hygiene  Resting BP < 160/90.  Use all medications properly and as prescribed.  Remain cigarette free.    Frequent Cough?: No  Educational Assessment:  Pulmonary Knowledge Test:   Intake: 12/15  Discharge: 10/15    Other Core Component Interventions/Education: Ensure patient is compliant with all medications throughout program.  Explain bronchial hygiene techniques.  Pre/post resting BPs at each session.  Discharge Assessment: Reviewed effects of exercise on BP: Yes  Reviewed knowledge test: Yes  Remains cigarette free at this time.  BP remains within target goal, compliant with meds. Pt reports no issues at this time.  Knowledge test score decreased by 14%.    Individual Patient Goals:  Be able to do more  Do more housework  Increase strength and endurance    Goal Status: Partially Met    Staff Comments:  Discharge Assessment: Troy has completed all 36 sessions of pulmonary rehab and did very well in the program. He was able to increase endurance to 40 minutes per session, individual session times varied due to back pain and back procedure. MET levels were increased to a max of 3.5. Troy reports a noted increase in strength and endurance outside of rehab and plans to continue exercise. Outcomes; MMRC score improved from 3 to 1, PHQ-9 improved from 7 to 3, CAT score improved from 25 to 14, weight gain of 1.2 lbs, Knowledge test score decreased by 14% and Duke Activity Status Index score improved from 12.7 to 18.95.     Rehab Staff Signature: Tony Mora,  RRT